# Patient Record
Sex: FEMALE | Race: BLACK OR AFRICAN AMERICAN
[De-identification: names, ages, dates, MRNs, and addresses within clinical notes are randomized per-mention and may not be internally consistent; named-entity substitution may affect disease eponyms.]

---

## 2020-03-04 ENCOUNTER — HOSPITAL ENCOUNTER (OUTPATIENT)
Dept: HOSPITAL 62 - ER | Age: 85
Setting detail: OBSERVATION
LOS: 2 days | Discharge: NURSING, REHAB OR LONG-TERM CARE | End: 2020-03-06
Attending: INTERNAL MEDICINE | Admitting: INTERNAL MEDICINE
Payer: MEDICARE

## 2020-03-04 DIAGNOSIS — E03.9: ICD-10-CM

## 2020-03-04 DIAGNOSIS — R00.0: ICD-10-CM

## 2020-03-04 DIAGNOSIS — F03.90: ICD-10-CM

## 2020-03-04 DIAGNOSIS — Z87.01: ICD-10-CM

## 2020-03-04 DIAGNOSIS — Z92.89: ICD-10-CM

## 2020-03-04 DIAGNOSIS — R06.82: ICD-10-CM

## 2020-03-04 DIAGNOSIS — E66.01: ICD-10-CM

## 2020-03-04 DIAGNOSIS — I11.0: ICD-10-CM

## 2020-03-04 DIAGNOSIS — R40.0: ICD-10-CM

## 2020-03-04 DIAGNOSIS — Z79.4: ICD-10-CM

## 2020-03-04 DIAGNOSIS — J44.1: Primary | ICD-10-CM

## 2020-03-04 DIAGNOSIS — R09.02: ICD-10-CM

## 2020-03-04 DIAGNOSIS — M17.0: ICD-10-CM

## 2020-03-04 DIAGNOSIS — Z79.82: ICD-10-CM

## 2020-03-04 DIAGNOSIS — R53.2: ICD-10-CM

## 2020-03-04 DIAGNOSIS — M19.90: ICD-10-CM

## 2020-03-04 DIAGNOSIS — I50.9: ICD-10-CM

## 2020-03-04 DIAGNOSIS — Z79.899: ICD-10-CM

## 2020-03-04 DIAGNOSIS — Z74.01: ICD-10-CM

## 2020-03-04 DIAGNOSIS — Z99.81: ICD-10-CM

## 2020-03-04 DIAGNOSIS — E11.65: ICD-10-CM

## 2020-03-04 LAB
ADD MANUAL DIFF: NO
ALBUMIN SERPL-MCNC: 3.4 G/DL (ref 3.5–5)
ALP SERPL-CCNC: 78 U/L (ref 38–126)
ANION GAP SERPL CALC-SCNC: 11 MMOL/L (ref 5–19)
APPEARANCE UR: CLEAR
APTT PPP: (no result) S
ARTERIAL BLOOD FIO2: (no result)
ARTERIAL BLOOD H2CO3: 1.57 MMOL/L (ref 1.05–1.35)
ARTERIAL BLOOD HCO3: 33.9 MMOL/L (ref 20–24)
ARTERIAL BLOOD PCO2: 52 MMHG (ref 35–45)
ARTERIAL BLOOD PH: 7.43 (ref 7.35–7.45)
ARTERIAL BLOOD PO2: 53.9 MMHG (ref 80–100)
ARTERIAL BLOOD TOTAL CO2: 35.5 MMOL/L (ref 21–25)
AST SERPL-CCNC: 18 U/L (ref 14–36)
BASE EXCESS BLDA CALC-SCNC: 8.5 MMOL/L
BASOPHILS # BLD AUTO: 0.1 10^3/UL (ref 0–0.2)
BASOPHILS NFR BLD AUTO: 0.9 % (ref 0–2)
BILIRUB DIRECT SERPL-MCNC: 0.3 MG/DL (ref 0–0.4)
BILIRUB SERPL-MCNC: 0.3 MG/DL (ref 0.2–1.3)
BILIRUB UR QL STRIP: NEGATIVE
BUN SERPL-MCNC: 12 MG/DL (ref 7–20)
CALCIUM: 9.8 MG/DL (ref 8.4–10.2)
CHLORIDE SERPL-SCNC: 97 MMOL/L (ref 98–107)
CK SERPL-CCNC: 34 U/L (ref 30–135)
CO2 SERPL-SCNC: 35 MMOL/L (ref 22–30)
EOSINOPHIL # BLD AUTO: 0.5 10^3/UL (ref 0–0.6)
EOSINOPHIL NFR BLD AUTO: 6.4 % (ref 0–6)
ERYTHROCYTE [DISTWIDTH] IN BLOOD BY AUTOMATED COUNT: 18.7 % (ref 11.5–14)
GLUCOSE SERPL-MCNC: 134 MG/DL (ref 75–110)
GLUCOSE UR STRIP-MCNC: NEGATIVE MG/DL
HCT VFR BLD CALC: 30.4 % (ref 36–47)
HGB BLD-MCNC: 9.2 G/DL (ref 12–15.5)
KETONES UR STRIP-MCNC: (no result) MG/DL
LYMPHOCYTES # BLD AUTO: 2.4 10^3/UL (ref 0.5–4.7)
LYMPHOCYTES NFR BLD AUTO: 34.5 % (ref 13–45)
MCH RBC QN AUTO: 25 PG (ref 27–33.4)
MCHC RBC AUTO-ENTMCNC: 30.4 G/DL (ref 32–36)
MCV RBC AUTO: 82 FL (ref 80–97)
MONOCYTES # BLD AUTO: 0.5 10^3/UL (ref 0.1–1.4)
MONOCYTES NFR BLD AUTO: 6.7 % (ref 3–13)
NEUTROPHILS # BLD AUTO: 3.6 10^3/UL (ref 1.7–8.2)
NEUTS SEG NFR BLD AUTO: 51.5 % (ref 42–78)
NITRITE UR QL STRIP: NEGATIVE
PH UR STRIP: 8 [PH] (ref 5–9)
PLATELET # BLD: 445 10^3/UL (ref 150–450)
POTASSIUM SERPL-SCNC: 4.5 MMOL/L (ref 3.6–5)
PROT SERPL-MCNC: 7.3 G/DL (ref 6.3–8.2)
PROT UR STRIP-MCNC: 100 MG/DL
RBC # BLD AUTO: 3.7 10^6/UL (ref 3.72–5.28)
SAO2 % BLDA: 88.4 % (ref 94–98)
SP GR UR STRIP: 1.01
TOTAL CELLS COUNTED % (AUTO): 100 %
UROBILINOGEN UR-MCNC: NEGATIVE MG/DL (ref ?–2)
WBC # BLD AUTO: 7.1 10^3/UL (ref 4–10.5)

## 2020-03-04 PROCEDURE — 93005 ELECTROCARDIOGRAM TRACING: CPT

## 2020-03-04 PROCEDURE — 71045 X-RAY EXAM CHEST 1 VIEW: CPT

## 2020-03-04 PROCEDURE — 36600 WITHDRAWAL OF ARTERIAL BLOOD: CPT

## 2020-03-04 PROCEDURE — 94660 CPAP INITIATION&MGMT: CPT

## 2020-03-04 PROCEDURE — 82550 ASSAY OF CK (CPK): CPT

## 2020-03-04 PROCEDURE — 94640 AIRWAY INHALATION TREATMENT: CPT

## 2020-03-04 PROCEDURE — 83735 ASSAY OF MAGNESIUM: CPT

## 2020-03-04 PROCEDURE — 97163 PT EVAL HIGH COMPLEX 45 MIN: CPT

## 2020-03-04 PROCEDURE — 93010 ELECTROCARDIOGRAM REPORT: CPT

## 2020-03-04 PROCEDURE — 84484 ASSAY OF TROPONIN QUANT: CPT

## 2020-03-04 PROCEDURE — 82803 BLOOD GASES ANY COMBINATION: CPT

## 2020-03-04 PROCEDURE — 80053 COMPREHEN METABOLIC PANEL: CPT

## 2020-03-04 PROCEDURE — 97530 THERAPEUTIC ACTIVITIES: CPT

## 2020-03-04 PROCEDURE — 85379 FIBRIN DEGRADATION QUANT: CPT

## 2020-03-04 PROCEDURE — 81001 URINALYSIS AUTO W/SCOPE: CPT

## 2020-03-04 PROCEDURE — 36415 COLL VENOUS BLD VENIPUNCTURE: CPT

## 2020-03-04 PROCEDURE — 80048 BASIC METABOLIC PNL TOTAL CA: CPT

## 2020-03-04 PROCEDURE — 87040 BLOOD CULTURE FOR BACTERIA: CPT

## 2020-03-04 PROCEDURE — 85025 COMPLETE CBC W/AUTO DIFF WBC: CPT

## 2020-03-04 PROCEDURE — 82962 GLUCOSE BLOOD TEST: CPT

## 2020-03-04 PROCEDURE — 99285 EMERGENCY DEPT VISIT HI MDM: CPT

## 2020-03-04 PROCEDURE — G0378 HOSPITAL OBSERVATION PER HR: HCPCS

## 2020-03-04 RX ADMIN — METOPROLOL TARTRATE SCH MG: 25 TABLET, FILM COATED ORAL at 23:23

## 2020-03-04 RX ADMIN — ZINC OXIDE SCH APPLIC: 200 OINTMENT TOPICAL at 23:24

## 2020-03-04 RX ADMIN — MIRTAZAPINE SCH MG: 15 TABLET, FILM COATED ORAL at 23:24

## 2020-03-04 RX ADMIN — GABAPENTIN SCH MG: 300 CAPSULE ORAL at 23:24

## 2020-03-04 RX ADMIN — PREDNISONE SCH MG: 20 TABLET ORAL at 23:59

## 2020-03-04 RX ADMIN — HEPARIN SODIUM SCH UNIT: 5000 INJECTION, SOLUTION INTRAVENOUS; SUBCUTANEOUS at 23:23

## 2020-03-04 NOTE — PDOC H&P
History of Present Illness


Admission Date/PCP: 


  03/04/20 19:19





  RAMAN REYES MD





History of Present Illness: 


MK SHELL is a 87 year old female with past medical history of COPD, 

severe community-acquired pneumonia, severe dementia, T2DM who presented from 

nursing facility after 1 day of acute respiratory distress with shortness of 

breath and HERNANDEZ.  Patient was reportedly admitted to [facility] for 

approximately 1 month for severe pneumonia and was released to nearby nursing [facility] on day of admission.  History cannot be obtained by patient due to severe

dementia, history primarily taken from ED staff and records.  Patient initially 

placed on BiPAP in ED but she did not tolerate this at all and this was removed.

 She became more stable on only 2 L nasal cannula which she uses at home already

chronically.  She does not look toxic on exam and is breathing comfortably on 2 

L nasal cannula.  He is afebrile and has a normal WBC.  Started on azithromycin,

steroids, scheduled nebulizer treatments.








Past Medical History


Cardiac Medical History: Reports: Congestive Heart Failure, Hyperlipidema, 

Hypertension, Other - Lymphedema


Pulmonary Medical History: Reports: Chronic Obstructive Pulmonary Disease (COPD

), Pneumonia


Neurological Medical History: Reports: Seizures


Endocrine Medical History: Reports: Diabetes Mellitus Type 2, Hypothyroidism


GI Medical History: Reports: Gastroesophageal Reflux Disease


Musculoskeltal Medical History: Reports: Arthritis, Gout


Psychiatric Medical History: Reports: Dementia





Social History


Information Source: Emergency Med Personnel, [facility] Records


Lives with: Nursing Home


Smoking Status: Unknown if Ever Smoked


Electronic Cigarette use?: No





- Advance Directive


Resuscitation Status: Full Code





Family History


Family History: Other - Family history is not available.


Parental Family History Reviewed: No - Patient has dementia and does not know 

answers to questions


Children Family History Reviewed: NA


Sibling(s) Family History Reviewed.: NA





Medication/Allergy


Home Medications: 








Acetaminophen [Tylenol 325 mg Tablet] 650 mg PO Q6HP PRN 03/04/20 


Albuterol Sulfate [Ventolin 0.083% Neb 2.5 mg/3 mL Ampul] 1 vial NEB Q6HP PRN 

03/04/20 


Albuterol Sulfate [Ventolin Hfa 8 gm Mdi] 2 puff IH Q4HP PRN 03/04/20 


Aspirin [Aspirin 81 mg Chewable Tablet] 81 mg PO DAILY 03/04/20 


Benzonatate [Tessalon Perles 100 mg Capsule] 100 mg PO TIDP PRN 03/04/20 


Budesonide/Formoterol Fumarate [Symbicort -4.5 mcg Inhaler 6 gm] 2 puff 

IH Q12 03/04/20 


Ferrous Sulfate [Feosol 325 mg Tablet] 325 mg PO DAILY 03/04/20 


Gabapentin [Neurontin 300 mg Capsule] 600 mg PO Q8 03/04/20 


Glimepiride [Amaryl 4 mg Tablet] 4 mg PO DAILY 03/04/20 


Insulin Glargine/Lixisenatide [Soliqua 100 Unit-33 Mcg/ml Pen] 28 units SQ QHS 

03/04/20 


Levothyroxine Sodium [Synthroid 0.05 mg Tablet] 0.05 mg PO Q6AM 03/04/20 


Metoprolol Tartrate [Lopressor 25 mg Tablet] 12.5 mg PO Q12 03/04/20 


Mirtazapine 30 mg PO QHS 03/04/20 


Montelukast Sodium [Singulair 10 mg Tablet] 10 mg PO DAILY 03/04/20 


Multivit,Calc,Mins/Iron/Folic [Thera M Plus Tablet] 1 tab PO QPM 03/04/20 


Omeprazole 40 mg PO BID 03/04/20 


Thiamine HCl [Thiamine 100 mg Tablet] 100 mg PO DAILY 03/04/20 


Zinc Oxide [Skin Protectant] 1 applic TOP Q12 03/04/20 








Allergies/Adverse Reactions: 


                                        





barley Allergy (Verified 03/04/20 12:20)


   


ranitidine Allergy (Verified 03/04/20 12:20)


   


sulfamethoxazole [From Bactrim] Allergy (Verified 03/04/20 12:20)


   


trimethoprim [From Bactrim] Allergy (Verified 03/04/20 12:20)


   


iodine Adverse Reaction (Verified 03/04/20 12:20)


   


shellfish derived Adverse Reaction (Verified 03/04/20 12:20)


   











Review of Systems


All systems: reviewed and no additional remarkable complaints except as stated -

ROS unobtainable due to severe dementia, see HPI





Physical Exam


Vital Signs: 


                                        











Temp Pulse Resp BP Pulse Ox


 


 99.2 F      29 H  140/65 H  100 


 


 03/04/20 20:15     03/04/20 20:01  03/04/20 20:01  03/04/20 20:01








                                 Intake & Output











 03/03/20 03/04/20 03/05/20





 06:59 06:59 06:59


 


Output Total   500


 


Balance   -500











General appearance: PRESENT: no acute distress, cooperative


Head exam: PRESENT: atraumatic, normocephalic


Eye exam: PRESENT: conjunctiva pink


Mouth exam: PRESENT: moist


Respiratory exam: PRESENT: decreased breath sounds - Overall bilateral chronic 

diminished breath sounds consistent with COPD, unlabored, wheezes.  ABSENT: 

crackles, rales, tachypnea


Cardiovascular exam: PRESENT: RRR.  ABSENT: diastolic murmur, rubs, systolic 

murmur


GI/Abdominal exam: PRESENT: normal bowel sounds, soft.  ABSENT: distended, 

guarding, mass, organolmegaly, rebound, tenderness


Rectal exam: PRESENT: deferred


Extremities exam: ABSENT: pedal edema


Neurological exam: PRESENT: alert, awake, oriented to person.  ABSENT: oriented 

to place, oriented to time, oriented to situation


Psychiatric exam: PRESENT: appropriate affect, normal mood


Skin exam: PRESENT: dry, intact, warm





Results


Laboratory Results: 


                                        





                                 03/04/20 16:20 





                                 03/04/20 16:20 





                                        











  03/04/20 03/04/20 03/04/20





  15:40 15:59 16:20


 


WBC    7.1


 


RBC    3.70 L


 


Hgb    9.2 L


 


Hct    30.4 L


 


MCV    82


 


MCH    25.0 L


 


MCHC    30.4 L


 


RDW    18.7 H


 


Plt Count    445


 


Seg Neutrophils %    51.5


 


Carbonic Acid  1.57 H  


 


HCO3/H2CO3 Ratio  21:1  


 


ABG pH  7.43  


 


ABG pCO2  52.0 H  


 


ABG pO2  53.9 L  


 


ABG HCO3  33.9 H  


 


ABG O2 Saturation  88.4 L  


 


ABG Base Excess  8.5  


 


FiO2  2L  


 


Sodium   


 


Potassium   


 


Chloride   


 


Carbon Dioxide   


 


Anion Gap   


 


BUN   


 


Creatinine   


 


Est GFR (African Amer)   


 


Glucose   


 


Calcium   


 


Total Bilirubin   


 


AST   


 


Alkaline Phosphatase   


 


Total Protein   


 


Albumin   


 


Urine Color   STRAW 


 


Urine Appearance   CLEAR 


 


Urine pH   8.0 


 


Ur Specific Gravity   1.010 


 


Urine Protein   100 H 


 


Urine Glucose (UA)   NEGATIVE 


 


Urine Ketones   TRACE H 


 


Urine Blood   NEGATIVE 


 


Urine Nitrite   NEGATIVE 


 


Ur Leukocyte Esterase   NEGATIVE 


 


Urine WBC (Auto)   1 


 


Urine RBC (Auto)   4 














  03/04/20





  16:20


 


WBC 


 


RBC 


 


Hgb 


 


Hct 


 


MCV 


 


MCH 


 


MCHC 


 


RDW 


 


Plt Count 


 


Seg Neutrophils % 


 


Carbonic Acid 


 


HCO3/H2CO3 Ratio 


 


ABG pH 


 


ABG pCO2 


 


ABG pO2 


 


ABG HCO3 


 


ABG O2 Saturation 


 


ABG Base Excess 


 


FiO2 


 


Sodium  142.6


 


Potassium  4.5


 


Chloride  97 L


 


Carbon Dioxide  35 H


 


Anion Gap  11


 


BUN  12


 


Creatinine  0.89


 


Est GFR (African Amer)  > 60


 


Glucose  134 H


 


Calcium  9.8


 


Total Bilirubin  0.3


 


AST  18


 


Alkaline Phosphatase  78


 


Total Protein  7.3


 


Albumin  3.4 L


 


Urine Color 


 


Urine Appearance 


 


Urine pH 


 


Ur Specific Gravity 


 


Urine Protein 


 


Urine Glucose (UA) 


 


Urine Ketones 


 


Urine Blood 


 


Urine Nitrite 


 


Ur Leukocyte Esterase 


 


Urine WBC (Auto) 


 


Urine RBC (Auto) 








                                        











  03/04/20 03/04/20





  16:20 16:20


 


Creatine Kinase  34 


 


Troponin I   < 0.012











Impressions: 


                                        





Chest X-Ray  03/04/20 00:00


IMPRESSION:  1. Cardiomegaly without pulmonary edema.


2. Asymmetric left retrocardiac opacities.  Clinical correlate to exclude a 

pneumonia is recommended.


 














Assessment and Plan





- Diagnosis


(1) COPD exacerbation


Is this a current diagnosis for this admission?: Yes   


Plan: 





Wheezing on exam consistent with COPD exacerbation, no discrete crackles noted 

on exam but somewhat limited exam quality due to habitus


Schedule duo nebs


Azithromycin


We will hold off on broader antibiotics for now as she has no fever and a 

normal WBC and was recently given a course of antibiotics at outside hospital


Check PVL BLE to rule out DVT and d-dimer given her prolonged immobility she is

at risk for DVT/PE; get CTA chest in a.m. if these tests are positive and treat 

appropriately


Prednisone 40 mg daily for 5 days








(2) T2DM (type 2 diabetes mellitus)


Qualifiers: 


   Diabetes mellitus long term insulin use: with long term use   Diabetes 

mellitus complication status: with hyperglycemia   Qualified Code(s): E11.65 - 

Type 2 diabetes mellitus with hyperglycemia; Z79.4 - Long term (current) use of 

insulin   


Is this a current diagnosis for this admission?: Yes   


Plan: 





Accu-Cheks


Continue home Lantus


Low-dose correctional insulin








(3) CHF (congestive heart failure)


Is this a current diagnosis for this admission?: Yes   


Plan: 





Unspecified CHF, unclear if diastolic or systolic


Consider requesting outside records in a.m.


Hold off on IV fluids unless absolutely indicated to avoid volume overload








(4) Hypothyroidism


Is this a current diagnosis for this admission?: Yes   


Plan: 





Synthroid continued








(5) Severe dementia


Is this a current diagnosis for this admission?: Yes   


Plan: 





Daughter is M POA


Bedbound at baseline per ED staff








(6) History of recent hospitalization


Is this a current diagnosis for this admission?: Yes   


Plan: 





Recently treated for pneumonia at [facility] then sent to SNF 3/4








- Time


Time Spent with patient: 35 or more minutes


Anticipated discharge: SNF





- Inpatient Certification


Medical Necessity: Significant Comorbidiites Make Outpatient Treatment Too 

Risky, Need Close Monitoring Due to Risk of Patient Decompensation

## 2020-03-04 NOTE — RADIOLOGY REPORT (SQ)
EXAM DESCRIPTION:  CHEST SINGLE VIEW



COMPLETED DATE/TIME:  3/4/2020 12:49 pm



REASON FOR STUDY:  respitory distress



COMPARISON:  None.



EXAM PARAMETERS:  NUMBER OF VIEWS: One view.

TECHNIQUE:  An AP view of the chest was obtained.

RADIATION DOSE: NA

LIMITATIONS: None.



FINDINGS:  LUNGS AND PLEURA: Asymmetric parenchymal opacities in the medial aspect of the inferior le
ft hemithorax.  There is no sizable pleural effusion or pneumothorax.

MEDIASTINUM AND HILAR STRUCTURES: No mediastinal or hilar contour abnormality.

HEART AND VASCULAR STRUCTURES: The cardiac silhouette is enlarged.  The pulmonary vasculature is with
in normal limits.

BONES: No acute findings.

HARDWARE: None in the chest.

OTHER: No other finding.



IMPRESSION:  1. Cardiomegaly without pulmonary edema.

2. Asymmetric left retrocardiac opacities.  Clinical correlate to exclude a pneumonia is recommended.




TECHNICAL DOCUMENTATION:  JOB ID:  [id number]

 2011 [employer]- All Rights Reserved



Reading location - IP/workstation name: [email or web address]

## 2020-03-04 NOTE — ER DOCUMENT REPORT
Entered by MARTINEZ SEGURA SCRIBE  03/04/20 1518 





Acting as scribe for:ANJANA BONILLA MD





ED General





- General


Chief Complaint: Respiratory Distress


Stated Complaint: DIFFICULTY BREATHING


Time Seen by Provider: 03/04/20 15:07


Primary Care Provider: 


RAMAN REYES MD [Primary Care Provider] - Follow up as needed


Information source: Patient, Outside Facility Records


Notes: 


87-year-old female with dementia and COPD presents to the emergency department 

via with respiratory distress. Patient was brought over from nursing home and 

reports shortness of breath.  


Patient was hospitalized at [place] on 2/3/2020 (1 month ago) for SOB and 

diagnosed with community-acquired pneumonia. Patient was transferred to [facility] 

on 2/12/2020. Patient was discharged yesterday (3/3/2020) and checked into 

nursing home yesterday.


TRAVEL OUTSIDE OF THE [place] IN LAST 30 DAYS: No





- Related Data


Allergies/Adverse Reactions: 


                                        





barley Allergy (Verified 03/04/20 12:20)


   


ranitidine Allergy (Verified 03/04/20 12:20)


   


sulfamethoxazole [From Bactrim] Allergy (Verified 03/04/20 12:20)


   


trimethoprim [From Bactrim] Allergy (Verified 03/04/20 12:20)


   


iodine Adverse Reaction (Verified 03/04/20 12:20)


   


shellfish derived Adverse Reaction (Verified 03/04/20 12:20)


   











Past Medical History





- General


Information source: Patient





- Social History


Smoking Status: Unknown if Ever Smoked


Cigarette use (# per day): No


Chew tobacco use (# tins/day): No


Smoking Education Provided: No


Frequency of alcohol use: None


Drug Abuse: None


Lives with: Nursing Home


Family History: Other - Family history is not available.


Patient has suicidal ideation: No


Patient has homicidal ideation: No





- Past Medical History


Cardiac Medical History: Reports: Hx Congestive Heart Failure, Hx 

Hypercholesterolemia, Hx Hypertension, Other - Lymphedema


Pulmonary Medical History: Reports: Hx COPD, Hx Pneumonia


Neurological Medical History: Reports: Hx Seizures


Endocrine Medical History: Reports: Hx Diabetes Mellitus Type 2, Hx 

Hypothyroidism


Renal/ Medical History: Reports: Hx Renal Insufficiency


GI Medical History: Reports: Hx Gastroesophageal Reflux Disease


Musculoskeletal Medical History: Reports Hx Arthritis, Reports Hx Gout


Psychiatric Medical History: Reports: Hx Dementia


Surgical Hx: Other - Surgical history unknown





Review of Systems





- Review of Systems


-: Yes ROS unobtainable due to patient's medical condition - Patient has 

dementia





Physical Exam





- Vital signs


Vitals: 


                                        











Resp


 


 29 H


 


 03/04/20 12:00














- Notes


Notes: 


Physical Exam:


 


General: Alert, appears well. Conversational. Morbidly Obese.


 


HEENT: Normocephalic. Atraumatic. PERRL. Extraocular movements intact. 

Oropharynx clear.


 


Neck: Supple. Non-tender.


 


Respiratory: Respiratory rate of 40. Rales and rhonchi in the lower lung fields 

on expiration. Moans with each expiration. 


 


Cardiovascular: Regular rate and rhythm. 


 


Abdominal: Normal Inspection. Non-tender. No distension. Normal Bowel Sounds. 


 


Back: No gross abnormalities. 


 


Extremities: Moves all four extremities.


Upper extremities: Normal inspection. Normal ROM.  


Lower extremities:  No edema. Normal ROM. Osteoarthritis changes in knees 

bilaterally.


 


Neurological: Normal cognition. AAOx4. Normal speech.  


 


Psychological: Normal affect. Normal Mood. 


 


Skin: Warm. Dry. Normal color.





Course





- Re-evaluation


Re-evalutation: 





03/04/20 18:29


Room air ABG shows a pH 7.43, PCO2 52, PO2 of 53.9, O2 saturation 88.4%.


Patient was placed on 2 liters O2 nasal cannula after the ABG results were 

available.  Pulse oximetry increased to 100%.











- Vital Signs


Vital signs: 


                                        











Temp Pulse Resp BP Pulse Ox


 


 98.4 F      35 H  169/86 H  91 L


 


 03/04/20 14:17     03/04/20 18:02  03/04/20 18:02  03/04/20 17:17














- Laboratory


Result Diagrams: 


                                 03/04/20 16:20





                                 03/04/20 16:20


Laboratory results interpreted by me: 


                                        











  03/04/20 03/04/20 03/04/20





  15:40 15:59 16:20


 


RBC    3.70 L


 


Hgb    9.2 L


 


Hct    30.4 L


 


MCH    25.0 L


 


MCHC    30.4 L


 


RDW    18.7 H


 


Eos % (Auto)    6.4 H


 


Carbonic Acid  1.57 H  


 


ABG pCO2  52.0 H  


 


ABG pO2  53.9 L  


 


ABG HCO3  33.9 H  


 


ABG Total CO2  35.5 H  


 


ABG O2 Saturation  88.4 L  


 


Chloride   


 


Carbon Dioxide   


 


Glucose   


 


Albumin   


 


Urine Protein   100 H 


 


Urine Ketones   TRACE H 














  03/04/20





  16:20


 


RBC 


 


Hgb 


 


Hct 


 


MCH 


 


MCHC 


 


RDW 


 


Eos % (Auto) 


 


Carbonic Acid 


 


ABG pCO2 


 


ABG pO2 


 


ABG HCO3 


 


ABG Total CO2 


 


ABG O2 Saturation 


 


Chloride  97 L


 


Carbon Dioxide  35 H


 


Glucose  134 H


 


Albumin  3.4 L


 


Urine Protein 


 


Urine Ketones 














- Diagnostic Test


Radiology reviewed: Image reviewed, Reports reviewed - Chest x-ray shows a left 

retrocardiac opacity





- EKG Interpretation by Me


EKG shows normal: Sinus rhythm, Axis, Intervals, QRS Complexes, ST-T Waves


Rate: Normal - 93


Rhythm: NSR





- Consults


  ** Dr. Staley


Consulted provider: other - Agrees with telemetry admit, will have the night 

hospitalist see the patient.





Critical Care Note





- Critical Care Note


Total time excluding time spent on procedures (mins): 40





Discharge





- Discharge


Clinical Impression: 


 Tachypnea, Hypoxemia, CO2 retention, Retrocardiac opacity, Tachycardia





COPD (chronic obstructive pulmonary disease)


Qualifiers:


 COPD type: unspecified COPD Qualified Code(s): J44.9 - Chronic obstructive 

pulmonary disease, unspecified





Hypertension


Qualifiers:


 Hypertension type: essential hypertension Qualified Code(s): I10 - Essential 

(primary) hypertension





Condition: Good


Disposition: ADMITTED AS INPATIENT


Admitting Provider: Luís (Hospitalist)


Unit Admitted: Telemetry


Referrals: 


RAMAN REYES MD [Primary Care Provider] - Follow up as needed





I personally performed the services described in the documentation, reviewed and

edited the documentation which was dictated to the scribe in my presence, and it

accurately records my words and actions.

## 2020-03-05 LAB
ADD MANUAL DIFF: NO
ANION GAP SERPL CALC-SCNC: 8 MMOL/L (ref 5–19)
ARTERIAL BLOOD FIO2: (no result)
ARTERIAL BLOOD H2CO3: 1.62 MMOL/L (ref 1.05–1.35)
ARTERIAL BLOOD HCO3: 34.9 MMOL/L (ref 20–24)
ARTERIAL BLOOD PCO2: 53.8 MMHG (ref 35–45)
ARTERIAL BLOOD PH: 7.43 (ref 7.35–7.45)
ARTERIAL BLOOD PO2: 86.8 MMHG (ref 80–100)
ARTERIAL BLOOD TOTAL CO2: 36.6 MMOL/L (ref 21–25)
BASE EXCESS BLDA CALC-SCNC: 9.3 MMOL/L
BASOPHILS # BLD AUTO: 0 10^3/UL (ref 0–0.2)
BASOPHILS NFR BLD AUTO: 0.6 % (ref 0–2)
BUN SERPL-MCNC: 12 MG/DL (ref 7–20)
CALCIUM: 9.8 MG/DL (ref 8.4–10.2)
CHLORIDE SERPL-SCNC: 98 MMOL/L (ref 98–107)
CO2 SERPL-SCNC: 36 MMOL/L (ref 22–30)
EOSINOPHIL # BLD AUTO: 0.1 10^3/UL (ref 0–0.6)
EOSINOPHIL NFR BLD AUTO: 1 % (ref 0–6)
ERYTHROCYTE [DISTWIDTH] IN BLOOD BY AUTOMATED COUNT: 19.5 % (ref 11.5–14)
GLUCOSE SERPL-MCNC: 143 MG/DL (ref 75–110)
HCT VFR BLD CALC: 29.5 % (ref 36–47)
HGB BLD-MCNC: 9.6 G/DL (ref 12–15.5)
LYMPHOCYTES # BLD AUTO: 1.3 10^3/UL (ref 0.5–4.7)
LYMPHOCYTES NFR BLD AUTO: 25.2 % (ref 13–45)
MCH RBC QN AUTO: 26.2 PG (ref 27–33.4)
MCHC RBC AUTO-ENTMCNC: 32.6 G/DL (ref 32–36)
MCV RBC AUTO: 80 FL (ref 80–97)
MONOCYTES # BLD AUTO: 0.1 10^3/UL (ref 0.1–1.4)
MONOCYTES NFR BLD AUTO: 1.9 % (ref 3–13)
NEUTROPHILS # BLD AUTO: 3.7 10^3/UL (ref 1.7–8.2)
NEUTS SEG NFR BLD AUTO: 71.3 % (ref 42–78)
PLATELET # BLD: 422 10^3/UL (ref 150–450)
POTASSIUM SERPL-SCNC: 4.9 MMOL/L (ref 3.6–5)
RBC # BLD AUTO: 3.67 10^6/UL (ref 3.72–5.28)
SAO2 % BLDA: 96.6 % (ref 94–98)
TOTAL CELLS COUNTED % (AUTO): 100 %
WBC # BLD AUTO: 5.2 10^3/UL (ref 4–10.5)

## 2020-03-05 RX ADMIN — HEPARIN SODIUM SCH UNIT: 5000 INJECTION, SOLUTION INTRAVENOUS; SUBCUTANEOUS at 13:17

## 2020-03-05 RX ADMIN — ZINC OXIDE SCH APPLIC: 200 OINTMENT TOPICAL at 10:59

## 2020-03-05 RX ADMIN — IPRATROPIUM BROMIDE AND ALBUTEROL SULFATE SCH ML: 2.5; .5 SOLUTION RESPIRATORY (INHALATION) at 20:01

## 2020-03-05 RX ADMIN — GLIMEPIRIDE SCH MG: 4 TABLET ORAL at 10:55

## 2020-03-05 RX ADMIN — PREDNISONE SCH MG: 20 TABLET ORAL at 10:58

## 2020-03-05 RX ADMIN — ACETAMINOPHEN PRN MG: 325 TABLET ORAL at 20:41

## 2020-03-05 RX ADMIN — HEPARIN SODIUM SCH UNIT: 5000 INJECTION, SOLUTION INTRAVENOUS; SUBCUTANEOUS at 05:56

## 2020-03-05 RX ADMIN — LEVOTHYROXINE SODIUM SCH MG: 50 TABLET ORAL at 05:54

## 2020-03-05 RX ADMIN — ASPIRIN SCH MG: 81 TABLET, CHEWABLE ORAL at 10:58

## 2020-03-05 RX ADMIN — INSULIN LISPRO SCH UNIT: 100 INJECTION, SOLUTION INTRAVENOUS; SUBCUTANEOUS at 00:24

## 2020-03-05 RX ADMIN — PANTOPRAZOLE SODIUM SCH MG: 40 TABLET, DELAYED RELEASE ORAL at 10:55

## 2020-03-05 RX ADMIN — IPRATROPIUM BROMIDE AND ALBUTEROL SULFATE SCH ML: 2.5; .5 SOLUTION RESPIRATORY (INHALATION) at 04:23

## 2020-03-05 RX ADMIN — IPRATROPIUM BROMIDE AND ALBUTEROL SULFATE SCH ML: 2.5; .5 SOLUTION RESPIRATORY (INHALATION) at 00:45

## 2020-03-05 RX ADMIN — MONTELUKAST SODIUM SCH MG: 10 TABLET, FILM COATED ORAL at 10:58

## 2020-03-05 RX ADMIN — MULTIVITAMIN TABLET SCH TAB: TABLET at 17:04

## 2020-03-05 RX ADMIN — IPRATROPIUM BROMIDE AND ALBUTEROL SULFATE SCH ML: 2.5; .5 SOLUTION RESPIRATORY (INHALATION) at 16:07

## 2020-03-05 RX ADMIN — GABAPENTIN SCH MG: 300 CAPSULE ORAL at 21:22

## 2020-03-05 RX ADMIN — MIRTAZAPINE SCH MG: 15 TABLET, FILM COATED ORAL at 21:22

## 2020-03-05 RX ADMIN — INSULIN LISPRO SCH UNIT: 100 INJECTION, SOLUTION INTRAVENOUS; SUBCUTANEOUS at 11:53

## 2020-03-05 RX ADMIN — IPRATROPIUM BROMIDE AND ALBUTEROL SULFATE SCH ML: 2.5; .5 SOLUTION RESPIRATORY (INHALATION) at 08:23

## 2020-03-05 RX ADMIN — HEPARIN SODIUM SCH UNIT: 5000 INJECTION, SOLUTION INTRAVENOUS; SUBCUTANEOUS at 21:22

## 2020-03-05 RX ADMIN — Medication SCH MG: at 10:58

## 2020-03-05 RX ADMIN — GABAPENTIN SCH MG: 300 CAPSULE ORAL at 13:17

## 2020-03-05 RX ADMIN — AZITHROMYCIN SCH MG: 250 TABLET, FILM COATED ORAL at 10:55

## 2020-03-05 RX ADMIN — METOPROLOL TARTRATE SCH MG: 25 TABLET, FILM COATED ORAL at 21:21

## 2020-03-05 RX ADMIN — IPRATROPIUM BROMIDE AND ALBUTEROL SULFATE SCH ML: 2.5; .5 SOLUTION RESPIRATORY (INHALATION) at 23:33

## 2020-03-05 RX ADMIN — FLUTICASONE FUROATE AND VILANTEROL TRIFENATATE SCH INHALER: 200; 25 POWDER RESPIRATORY (INHALATION) at 10:58

## 2020-03-05 RX ADMIN — INSULIN LISPRO SCH UNIT: 100 INJECTION, SOLUTION INTRAVENOUS; SUBCUTANEOUS at 05:56

## 2020-03-05 RX ADMIN — FERROUS SULFATE TAB 325 MG (65 MG ELEMENTAL FE) SCH MG: 325 (65 FE) TAB at 10:58

## 2020-03-05 RX ADMIN — PANTOPRAZOLE SODIUM SCH MG: 40 TABLET, DELAYED RELEASE ORAL at 17:04

## 2020-03-05 RX ADMIN — GABAPENTIN SCH MG: 300 CAPSULE ORAL at 05:54

## 2020-03-05 RX ADMIN — METOPROLOL TARTRATE SCH MG: 25 TABLET, FILM COATED ORAL at 10:58

## 2020-03-05 RX ADMIN — INSULIN LISPRO SCH UNIT: 100 INJECTION, SOLUTION INTRAVENOUS; SUBCUTANEOUS at 17:03

## 2020-03-05 RX ADMIN — IPRATROPIUM BROMIDE AND ALBUTEROL SULFATE SCH ML: 2.5; .5 SOLUTION RESPIRATORY (INHALATION) at 12:19

## 2020-03-05 RX ADMIN — ZINC OXIDE SCH APPLIC: 200 OINTMENT TOPICAL at 21:27

## 2020-03-05 NOTE — PDOC PROGRESS REPORT
Subjective


Progress Note for:: 03/05/20


Subjective:: 


Per the nurses the patient had a reasonable night.  Her breathing has settled.  

She is not on BiPAP at this time.  She is somewhat somnolent.  The nurses state 

that this is not out of her normal pattern of behavior.  They did reach out to 

the skilled facility for further information.  Because of her history of COPD 

and her elevated PCO2 on admitting blood gas I am going to check another ABG.


Reason For Visit: 


COPD EXACERBATION








Physical Exam


Vital Signs: 


                                        











Temp Pulse Resp BP Pulse Ox


 


 98.0 F   88   18   139/61 H  90 L


 


 03/05/20 11:16  03/05/20 12:19  03/05/20 12:19  03/05/20 11:16  03/05/20 12:19








                                 Intake & Output











 03/04/20 03/05/20 03/06/20





 06:59 06:59 06:59


 


Output Total  750 300


 


Balance  -750 -300


 


Weight  97.7 kg 











General appearance: PRESENT: no acute distress, other - Somnolent


Head exam: PRESENT: atraumatic, normocephalic


Respiratory exam: PRESENT: clear to auscultation jane - Anteriorly, symmetrical, 

unlabored.  ABSENT: rales, rhonchi, tachypnea, wheezes


Cardiovascular exam: PRESENT: RRR, +S1, +S2


GI/Abdominal exam: PRESENT: normal bowel sounds, soft.  ABSENT: distended, 

tenderness


Rectal exam: PRESENT: deferred


Neurological exam: PRESENT: altered - Patient is quite somnolent.  I did make 

several attempts to awaken the patient.  Nurses report that after discussing 

with the skilled facility this is possibly her baseline pattern of behavior.  

ABSENT: awake





Results


Laboratory Results: 


                                        





                                 03/05/20 05:08 





                                 03/05/20 05:08 





                                        











  03/04/20 03/04/20 03/04/20





  15:40 15:59 16:20


 


WBC    7.1


 


RBC    3.70 L


 


Hgb    9.2 L


 


Hct    30.4 L


 


MCV    82


 


MCH    25.0 L


 


MCHC    30.4 L


 


RDW    18.7 H


 


Plt Count    445


 


Seg Neutrophils %    51.5


 


Carbonic Acid  1.57 H  


 


HCO3/H2CO3 Ratio  21:1  


 


ABG pH  7.43  


 


ABG pCO2  52.0 H  


 


ABG pO2  53.9 L  


 


ABG HCO3  33.9 H  


 


ABG O2 Saturation  88.4 L  


 


ABG Base Excess  8.5  


 


FiO2  2L  


 


Sodium   


 


Potassium   


 


Chloride   


 


Carbon Dioxide   


 


Anion Gap   


 


BUN   


 


Creatinine   


 


Est GFR (African Amer)   


 


Glucose   


 


Calcium   


 


Magnesium   


 


Total Bilirubin   


 


AST   


 


Alkaline Phosphatase   


 


Total Protein   


 


Albumin   


 


Urine Color   STRAW 


 


Urine Appearance   CLEAR 


 


Urine pH   8.0 


 


Ur Specific Gravity   1.010 


 


Urine Protein   100 H 


 


Urine Glucose (UA)   NEGATIVE 


 


Urine Ketones   TRACE H 


 


Urine Blood   NEGATIVE 


 


Urine Nitrite   NEGATIVE 


 


Ur Leukocyte Esterase   NEGATIVE 


 


Urine WBC (Auto)   1 


 


Urine RBC (Auto)   4 














  03/04/20 03/05/20 03/05/20





  16:20 05:08 05:08


 


WBC   5.2 


 


RBC   3.67 L 


 


Hgb   9.6 L 


 


Hct   29.5 L 


 


MCV   80 


 


MCH   26.2 L 


 


MCHC   32.6 


 


RDW   19.5 H 


 


Plt Count   422 


 


Seg Neutrophils %   71.3 


 


Carbonic Acid   


 


HCO3/H2CO3 Ratio   


 


ABG pH   


 


ABG pCO2   


 


ABG pO2   


 


ABG HCO3   


 


ABG O2 Saturation   


 


ABG Base Excess   


 


FiO2   


 


Sodium  142.6   142.1


 


Potassium  4.5   4.9


 


Chloride  97 L   98


 


Carbon Dioxide  35 H   36 H


 


Anion Gap  11   8


 


BUN  12   12


 


Creatinine  0.89   0.77


 


Est GFR ( Amer)  > 60   > 60


 


Glucose  134 H   143 H


 


Calcium  9.8   9.8


 


Magnesium    2.1


 


Total Bilirubin  0.3  


 


AST  18  


 


Alkaline Phosphatase  78  


 


Total Protein  7.3  


 


Albumin  3.4 L  


 


Urine Color   


 


Urine Appearance   


 


Urine pH   


 


Ur Specific Gravity   


 


Urine Protein   


 


Urine Glucose (UA)   


 


Urine Ketones   


 


Urine Blood   


 


Urine Nitrite   


 


Ur Leukocyte Esterase   


 


Urine WBC (Auto)   


 


Urine RBC (Auto)   








                                        











  03/04/20 03/04/20





  16:20 16:20


 


Creatine Kinase  34 


 


Troponin I   < 0.012











Impressions: 


                                        





Chest X-Ray  03/04/20 00:00


IMPRESSION:  1. Cardiomegaly without pulmonary edema.


2. Asymmetric left retrocardiac opacities.  Clinical correlate to exclude a pne

umonia is recommended.


 














Assessment and Plan





- Diagnosis


(1) COPD exacerbation


Is this a current diagnosis for this admission?: Yes   


Plan: 





Wheezing on exam consistent with COPD exacerbation, no discrete crackles noted 

on exam but somewhat limited exam quality due to habitus


Schedule duo nebs


Azithromycin


We will hold off on broader antibiotics for now as she has no fever and a prerna

l WBC and was recently given a course of antibiotics at outside hospital


Check PVL BLE to rule out DVT and d-dimer given her prolonged immobility she is

at risk for DVT/PE; get CTA chest in a.m. if these tests are positive and treat 

appropriately


Prednisone 40 mg daily for 5 days


3/5/2020


The patient is somnolent.  This could be related to retained carbon dioxide.  I 

will check a blood gas.  Nursing reports that this may be her baseline.  We will

continue her current treatment regimen including nebulizer treatments and 

steroids.  She was on BiPAP yesterday.  Currently on nasal cannula.  Continue 

oxygen supplementation to keep saturation 88 to 92%.








(2) T2DM (type 2 diabetes mellitus)


Qualifiers: 


   Diabetes mellitus long term insulin use: with long term use   Diabetes 

mellitus complication status: with hyperglycemia   Qualified Code(s): E11.65 - 

Type 2 diabetes mellitus with hyperglycemia; Z79.4 - Long term (current) use of 

insulin   


Is this a current diagnosis for this admission?: Yes   


Plan: 





Accu-Cheks


Continue home Lantus


Low-dose correctional insulin


3/5/2020


Serum glucose is about the same.  Accu-Cheks are slightly higher.  Continue 

current regimen and adjust medications based on sliding scale requirements.








(3) CHF (congestive heart failure)


Qualifiers: 


   Heart failure type: unspecified 


Is this a current diagnosis for this admission?: Yes   


Plan: 





Unspecified CHF, unclear if diastolic or systolic


Consider requesting outside records in a.m.


Hold off on IV fluids unless absolutely indicated to avoid volume overload


3/5/2020


No evidence of overt failure.  We will continue current regimen at this time.








(4) Hypothyroidism


Qualifiers: 


   Hypothyroidism type: unspecified   Qualified Code(s): E03.9 - Hypothyroidism,

unspecified   


Is this a current diagnosis for this admission?: Yes   


Plan: 





Synthroid continued


3/5/2020


Continue current dose of levothyroxine.  If somnolence persists consider 

rechecking thyroid laboratory studies.








(5) Severe dementia


Is this a current diagnosis for this admission?: Yes   


Plan: 





Daughter is M POA


Bedbound at baseline per ED staff


3/5/2020


No acute intervention at this time as this appears to be the patient's baseline.








(6) History of recent hospitalization


Is this a current diagnosis for this admission?: Yes   


Plan: 





Recently treated for pneumonia at [facility] then sent to SNF 3/4


3/5/2020


Respiratory status is better than on admission.  Continue current treatment for 

pneumonia.  If this is truly her baseline then strong consideration needs to be 

given to review CODE STATUS and realistic expectations with the patient's 

family.








- Time


Time Spent with patient: Less than 15 minutes


Medications reviewed and adjusted accordingly: Yes


Anticipated discharge: SNF

## 2020-03-06 VITALS — DIASTOLIC BLOOD PRESSURE: 82 MMHG | SYSTOLIC BLOOD PRESSURE: 139 MMHG

## 2020-03-06 RX ADMIN — IPRATROPIUM BROMIDE AND ALBUTEROL SULFATE SCH: 2.5; .5 SOLUTION RESPIRATORY (INHALATION) at 12:16

## 2020-03-06 RX ADMIN — Medication SCH MG: at 09:08

## 2020-03-06 RX ADMIN — GLIMEPIRIDE SCH MG: 4 TABLET ORAL at 09:08

## 2020-03-06 RX ADMIN — GABAPENTIN SCH MG: 300 CAPSULE ORAL at 06:19

## 2020-03-06 RX ADMIN — HEPARIN SODIUM SCH UNIT: 5000 INJECTION, SOLUTION INTRAVENOUS; SUBCUTANEOUS at 06:19

## 2020-03-06 RX ADMIN — IPRATROPIUM BROMIDE AND ALBUTEROL SULFATE SCH ML: 2.5; .5 SOLUTION RESPIRATORY (INHALATION) at 15:38

## 2020-03-06 RX ADMIN — METOPROLOL TARTRATE SCH MG: 25 TABLET, FILM COATED ORAL at 09:08

## 2020-03-06 RX ADMIN — INSULIN LISPRO SCH UNIT: 100 INJECTION, SOLUTION INTRAVENOUS; SUBCUTANEOUS at 17:14

## 2020-03-06 RX ADMIN — INSULIN LISPRO SCH UNIT: 100 INJECTION, SOLUTION INTRAVENOUS; SUBCUTANEOUS at 11:45

## 2020-03-06 RX ADMIN — INSULIN LISPRO SCH UNIT: 100 INJECTION, SOLUTION INTRAVENOUS; SUBCUTANEOUS at 00:15

## 2020-03-06 RX ADMIN — IPRATROPIUM BROMIDE AND ALBUTEROL SULFATE SCH ML: 2.5; .5 SOLUTION RESPIRATORY (INHALATION) at 04:05

## 2020-03-06 RX ADMIN — ACETAMINOPHEN PRN MG: 325 TABLET ORAL at 16:25

## 2020-03-06 RX ADMIN — PANTOPRAZOLE SODIUM SCH MG: 40 TABLET, DELAYED RELEASE ORAL at 09:09

## 2020-03-06 RX ADMIN — ASPIRIN SCH MG: 81 TABLET, CHEWABLE ORAL at 09:08

## 2020-03-06 RX ADMIN — LEVOTHYROXINE SODIUM SCH MG: 50 TABLET ORAL at 06:19

## 2020-03-06 RX ADMIN — AZITHROMYCIN SCH MG: 250 TABLET, FILM COATED ORAL at 09:08

## 2020-03-06 RX ADMIN — INSULIN LISPRO SCH UNIT: 100 INJECTION, SOLUTION INTRAVENOUS; SUBCUTANEOUS at 06:18

## 2020-03-06 RX ADMIN — FLUTICASONE FUROATE AND VILANTEROL TRIFENATATE SCH INHALER: 200; 25 POWDER RESPIRATORY (INHALATION) at 09:10

## 2020-03-06 RX ADMIN — PREDNISONE SCH MG: 20 TABLET ORAL at 09:08

## 2020-03-06 RX ADMIN — HEPARIN SODIUM SCH UNIT: 5000 INJECTION, SOLUTION INTRAVENOUS; SUBCUTANEOUS at 13:24

## 2020-03-06 RX ADMIN — GABAPENTIN SCH MG: 300 CAPSULE ORAL at 13:23

## 2020-03-06 RX ADMIN — FERROUS SULFATE TAB 325 MG (65 MG ELEMENTAL FE) SCH MG: 325 (65 FE) TAB at 09:08

## 2020-03-06 RX ADMIN — IPRATROPIUM BROMIDE AND ALBUTEROL SULFATE SCH ML: 2.5; .5 SOLUTION RESPIRATORY (INHALATION) at 08:17

## 2020-03-06 RX ADMIN — MULTIVITAMIN TABLET SCH TAB: TABLET at 17:14

## 2020-03-06 RX ADMIN — ZINC OXIDE SCH APPLIC: 200 OINTMENT TOPICAL at 09:09

## 2020-03-06 RX ADMIN — PANTOPRAZOLE SODIUM SCH MG: 40 TABLET, DELAYED RELEASE ORAL at 17:14

## 2020-03-06 RX ADMIN — MONTELUKAST SODIUM SCH MG: 10 TABLET, FILM COATED ORAL at 09:08

## 2020-03-06 NOTE — PDOC TRANSFER SUMMARY
Impression





- Admit/DC Date/PCP


Admission Date/Primary Care Provider: 


  03/04/20 19:19





  RAMAN REYES MD





Discharge Date: 03/06/20





- Discharge Diagnosis


(1) COPD exacerbation


Is this a current diagnosis for this admission?: Yes   





(2) History of recent hospitalization


Is this a current diagnosis for this admission?: Yes   





(3) Hypothyroidism


Is this a current diagnosis for this admission?: Yes   





(4) Severe dementia


Is this a current diagnosis for this admission?: Yes   





(5) T2DM (type 2 diabetes mellitus)


Is this a current diagnosis for this admission?: Yes   





- Additional Information


Resuscitation Status: Full Code


Referrals: 


RAMAN REYES MD [Primary Care Provider] - Follow up as needed


Prescriptions: 


Azithromycin [Zithromax 250 mg Tablet] 250 mg PO ASDIR PRN 4 Days #4 tablet


 PRN Reason: 


Home Medications: 








Acetaminophen [Tylenol 325 mg Tablet] 650 mg PO Q6HP PRN 03/04/20 


Albuterol Sulfate [Ventolin 0.083% Neb 2.5 mg/3 mL Ampul] 1 vial NEB Q6HP PRN 

03/04/20 


Albuterol Sulfate [Ventolin Hfa 8 gm Mdi] 2 puff IH Q4HP PRN 03/04/20 


Aspirin [Aspirin 81 mg Chewable Tablet] 81 mg PO DAILY 03/04/20 


Benzonatate [Tessalon Perles 100 mg Capsule] 100 mg PO TIDP PRN 03/04/20 


Budesonide/Formoterol Fumarate [Symbicort -4.5 mcg Inhaler 6 gm] 2 puff 

IH Q12 03/04/20 


Ferrous Sulfate [Feosol 325 mg Tablet] 325 mg PO DAILY 03/04/20 


Gabapentin [Neurontin 300 mg Capsule] 600 mg PO Q8 03/04/20 


Glimepiride [Amaryl 4 mg Tablet] 4 mg PO DAILY 03/04/20 


Insulin Glargine/Lixisenatide [Soliqua 100 Unit-33 Mcg/ml Pen] 28 units SQ QHS 

03/04/20 


Levothyroxine Sodium [Synthroid 0.05 mg Tablet] 0.05 mg PO Q6AM 03/04/20 


Metoprolol Tartrate [Lopressor 25 mg Tablet] 12.5 mg PO Q12 03/04/20 


Mirtazapine 30 mg PO QHS 03/04/20 


Montelukast Sodium [Singulair 10 mg Tablet] 10 mg PO DAILY 03/04/20 


Multivit,Calc,Mins/Iron/Folic [Thera M Plus Tablet] 1 tab PO QPM 03/04/20 


Omeprazole 40 mg PO BID 03/04/20 


Thiamine HCl [Thiamine 100 mg Tablet] 100 mg PO DAILY 03/04/20 


Zinc Oxide [Skin Protectant] 1 applic TOP Q12 03/04/20 


Azithromycin [Zithromax 250 mg Tablet] 250 mg PO ASDIR PRN 4 Days #4 tablet 

03/06/20 


Prednisone [Deltasone 20 mg Tablet] 40 mg PO DAILY 3 Days  tablet 03/06/20 











History of Present Illiness


History of Present Illness: 


MK SHELL is a 87 year old female with past medical history of COPD, 

severe community-acquired pneumonia, severe dementia, T2DM who presented from 

nursing facility after 1 day of acute respiratory distress with shortness of 

breath and HERNANDEZ.  Patient was reportedly [facility] 

approximately 1 month for severe pneumonia and was released to nearby nursing 

facility on day of admission.  History cannot be obtained by patient due to 

severe dementia, history primarily taken from ED staff and records.  Patient 

initially placed on BiPAP in ED but she did not tolerate this at all and this 

was removed.  She became more stable on only 2 L nasal cannula which she uses at

home already chronically.  She does not look toxic on exam and is breathing 

comfortably on 2 L nasal cannula.  He is afebrile and has a normal WBC.  Started

on azithromycin, steroids, scheduled nebulizer treatments.











Hospital Course


Hospital Course: 


Patient was admitted to the hospital for evaluation of respiratory distress.  On

initial presentation, patient was seen respiratory distress and required to be 

shortly placed on BiPAP.  Initially noted to be mildly hypoxic at 90% on room 

air.  She required BiPAP only briefly which was subsequently discontinued.  She 

was thought to be having a COPD exacerbation.  Chest x-ray showed mild remnant 

opacities indicative of resolving recent pneumonia.  No evidence of leukocytosis

on lab work or other findings to suggest new or incompletely treated pneumonia. 

Patient is being treated for COPD exacerbation and has received nebulizers as 

well as some doses of steroids and azithromycin.  Patient has tolerated on room 

air for the past day and is comfortable today.  Patient is to continue breathing

treatments with albuterol nebulizer 3 times a day for the next 3 days after 

which only given on an as needed basis.  Patient is being discharged with 

prednisone to continue for 3 more days as well as azithromycin to continue for 4

days.  Patient has documentation of prior chronic congestive heart failure EF 

unknown but does not seem to be having any acute exacerbation of her CHF at this

time.





Physical Exam


Vital Signs: 


                                        











Temp Pulse Resp BP Pulse Ox


 


 97.8 F   79   17   145/71 H  97 


 


 03/06/20 12:04  03/06/20 12:04  03/06/20 12:04  03/06/20 12:04  03/06/20 12:04








                                 Intake & Output











 03/05/20 03/06/20 03/07/20





 06:59 06:59 06:59


 


Intake Total  594 


 


Output Total 750 500 


 


Balance -750 94 


 


Weight 97.7 kg 99.9 kg 











General appearance: PRESENT: no acute distress, cooperative


Neck exam: ABSENT: JVD


Respiratory exam: PRESENT: clear to auscultation jane, unlabored.  ABSENT: 

retraction, tachypnea, wheezes


Cardiovascular exam: PRESENT: RRR, +S1, +S2.  ABSENT: tachycardia


Neurological exam: PRESENT: alert, awake





Results


Laboratory Results: 


                                        











WBC  5.2 10^3/uL (4.0-10.5)   03/05/20  05:08    


 


RBC  3.67 10^6/uL (3.72-5.28)  L  03/05/20  05:08    


 


Hgb  9.6 g/dL (12.0-15.5)  L  03/05/20  05:08    


 


Hct  29.5 % (36.0-47.0)  L  03/05/20  05:08    


 


MCV  80 fl (80-97)   03/05/20  05:08    


 


MCH  26.2 pg (27.0-33.4)  L  03/05/20  05:08    


 


MCHC  32.6 g/dL (32.0-36.0)   03/05/20  05:08    


 


RDW  19.5 % (11.5-14.0)  H  03/05/20  05:08    


 


Plt Count  422 10^3/uL (150-450)   03/05/20  05:08    


 


Lymph % (Auto)  25.2 % (13-45)   03/05/20  05:08    


 


Mono % (Auto)  1.9 % (3-13)  L  03/05/20  05:08    


 


Eos % (Auto)  1.0 % (0-6)   03/05/20  05:08    


 


Baso % (Auto)  0.6 % (0-2)   03/05/20  05:08    


 


Absolute Neuts (auto)  3.7 10^3/uL (1.7-8.2)   03/05/20  05:08    


 


Absolute Lymphs (auto)  1.3 10^3/uL (0.5-4.7)   03/05/20  05:08    


 


Absolute Monos (auto)  0.1 10^3/uL (0.1-1.4)   03/05/20  05:08    


 


Absolute Eos (auto)  0.1 10^3/uL (0.0-0.6)   03/05/20  05:08    


 


Absolute Basos (auto)  0.0 10^3/uL (0.0-0.2)   03/05/20  05:08    


 


Seg Neutrophils %  71.3 % (42-78)   03/05/20  05:08    


 


D-Dimer  2.73 ug/mL (0.00-0.50)  H  03/04/20  21:44    


 


Carbonic Acid  1.62 mmol/L (1.05-1.35)  H  03/05/20  14:25    


 


HCO3/H2CO3 Ratio  21:1   03/05/20  14:25    


 


ABG pH  7.43  (7.35-7.45)   03/05/20  14:25    


 


ABG pCO2  53.8 mmHg (35-45)  H  03/05/20  14:25    


 


ABG pO2  86.8 mmHg ()   03/05/20  14:25    


 


ABG HCO3  34.9 mmol/L (20-24)  H  03/05/20  14:25    


 


ABG Total CO2  36.6 mmol/L (21-25)  H  03/05/20  14:25    


 


ABG O2 Saturation  96.6 % (94-98)   03/05/20  14:25    


 


ABG Base Excess  9.3 mmol/L  03/05/20  14:25    


 


FiO2  2L   03/05/20  14:25    


 


Sodium  142.1 mmol/L (137-145)   03/05/20  05:08    


 


Potassium  4.9 mmol/L (3.6-5.0)   03/05/20  05:08    


 


Chloride  98 mmol/L ()   03/05/20  05:08    


 


Carbon Dioxide  36 mmol/L (22-30)  H  03/05/20  05:08    


 


Anion Gap  8  (5-19)   03/05/20  05:08    


 


BUN  12 mg/dL (7-20)   03/05/20  05:08    


 


Creatinine  0.77 mg/dL (0.52-1.25)   03/05/20  05:08    


 


Est GFR ( Amer)  > 60  (>60)   03/05/20  05:08    


 


Est GFR (MDRD) Non-Af  > 60  (>60)   03/05/20  05:08    


 


Glucose  143 mg/dL ()  H  03/05/20  05:08    


 


POC Glucose  223 mg/dL ()  H  03/06/20  11:34    


 


Calcium  9.8 mg/dL (8.4-10.2)   03/05/20  05:08    


 


Magnesium  2.1 mg/dL (1.6-2.3)   03/05/20  05:08    


 


Total Bilirubin  0.3 mg/dL (0.2-1.3)   03/04/20  16:20    


 


Direct Bilirubin  0.3 mg/dL (0.0-0.4)   03/04/20  16:20    


 


Neonat Total Bilirubin  Not Reportable   03/04/20  16:20    


 


Neonat Direct Bilirubin  Not Reportable   03/04/20  16:20    


 


Neonat Indirect Bili  Not Reportable   03/04/20  16:20    


 


AST  18 U/L (14-36)   03/04/20  16:20    


 


ALT  9 U/L (<35)   03/04/20  16:20    


 


Alkaline Phosphatase  78 U/L ()   03/04/20  16:20    


 


Creatine Kinase  34 U/L ()   03/04/20  16:20    


 


Troponin I  < 0.012 ng/mL  03/04/20  16:20    


 


Total Protein  7.3 g/dL (6.3-8.2)   03/04/20  16:20    


 


Albumin  3.4 g/dL (3.5-5.0)  L  03/04/20  16:20    


 


Urine Color  STRAW   03/04/20  15:59    


 


Urine Appearance  CLEAR   03/04/20  15:59    


 


Urine pH  8.0  (5.0-9.0)   03/04/20  15:59    


 


Ur Specific Gravity  1.010   03/04/20  15:59    


 


Urine Protein  100 mg/dL (NEGATIVE)  H  03/04/20  15:59    


 


Urine Glucose (UA)  NEGATIVE mg/dL (NEGATIVE)   03/04/20  15:59    


 


Urine Ketones  TRACE mg/dL (NEGATIVE)  H  03/04/20  15:59    


 


Urine Blood  NEGATIVE  (NEGATIVE)   03/04/20  15:59    


 


Urine Nitrite  NEGATIVE  (NEGATIVE)   03/04/20  15:59    


 


Urine Bilirubin  NEGATIVE  (NEGATIVE)   03/04/20  15:59    


 


Urine Urobilinogen  NEGATIVE mg/dL (<2.0)   03/04/20  15:59    


 


Ur Leukocyte Esterase  NEGATIVE  (NEGATIVE)   03/04/20  15:59    


 


Urine WBC (Auto)  1 /HPF  03/04/20  15:59    


 


Urine RBC (Auto)  4 /HPF  03/04/20  15:59    


 


Squamous Epi Cells Auto  1 /HPF  03/04/20  15:59    


 


Urine Mucus (Auto)  RARE /LPF  03/04/20  15:59    


 


Urine Ascorbic Acid  NEGATIVE  (NEGATIVE)   03/04/20  15:59    








                                        











  03/04/20





  16:20


 


Troponin I  < 0.012











Impressions: 


                                        





Chest X-Ray  03/04/20 00:00


IMPRESSION:  1. Cardiomegaly without pulmonary edema.


2. Asymmetric left retrocardiac opacities.  Clinical correlate to exclude a 

pneumonia is recommended.


 














Plan


Time Spent: Less than 30 Minutes





Stroke


Is this a Stroke Patient?: No





Acute Heart Failure





- **


Is this a Heart Failure Patient?: Yes


Documentation of LVEF assessment?: No, Document reason - Not in active heart 

failure.  She just has an apparent history of CHF.


LVEF < 40%?: No- if no continue to question #3


3. Anticoagulant therapy for permanect/persistent/paraoxysmal Afib or Aflutter: 

N/A

## 2020-03-25 ENCOUNTER — HOSPITAL ENCOUNTER (OUTPATIENT)
Dept: HOSPITAL 62 - ER | Age: 85
Setting detail: OBSERVATION
LOS: 1 days | Discharge: NURSING, REHAB OR LONG-TERM CARE | End: 2020-03-26
Attending: FAMILY MEDICINE | Admitting: FAMILY MEDICINE
Payer: MEDICARE

## 2020-03-25 DIAGNOSIS — E11.65: ICD-10-CM

## 2020-03-25 DIAGNOSIS — I11.0: ICD-10-CM

## 2020-03-25 DIAGNOSIS — Z87.01: ICD-10-CM

## 2020-03-25 DIAGNOSIS — R00.0: ICD-10-CM

## 2020-03-25 DIAGNOSIS — E66.9: ICD-10-CM

## 2020-03-25 DIAGNOSIS — Z79.4: ICD-10-CM

## 2020-03-25 DIAGNOSIS — R07.9: Primary | ICD-10-CM

## 2020-03-25 DIAGNOSIS — E03.9: ICD-10-CM

## 2020-03-25 DIAGNOSIS — J44.9: ICD-10-CM

## 2020-03-25 DIAGNOSIS — F03.90: ICD-10-CM

## 2020-03-25 DIAGNOSIS — K21.9: ICD-10-CM

## 2020-03-25 DIAGNOSIS — I50.9: ICD-10-CM

## 2020-03-25 DIAGNOSIS — Z79.82: ICD-10-CM

## 2020-03-25 DIAGNOSIS — Z79.899: ICD-10-CM

## 2020-03-25 LAB
ADD MANUAL DIFF: NO
ALBUMIN SERPL-MCNC: 3.1 G/DL (ref 3.5–5)
ALP SERPL-CCNC: 101 U/L (ref 38–126)
ANION GAP SERPL CALC-SCNC: 4 MMOL/L (ref 5–19)
AST SERPL-CCNC: 25 U/L (ref 14–36)
BASOPHILS # BLD AUTO: 0.1 10^3/UL (ref 0–0.2)
BASOPHILS NFR BLD AUTO: 0.8 % (ref 0–2)
BILIRUB DIRECT SERPL-MCNC: 0.3 MG/DL (ref 0–0.4)
BILIRUB SERPL-MCNC: 0.3 MG/DL (ref 0.2–1.3)
BUN SERPL-MCNC: 17 MG/DL (ref 7–20)
CALCIUM: 9.7 MG/DL (ref 8.4–10.2)
CHLORIDE SERPL-SCNC: 95 MMOL/L (ref 98–107)
CO2 SERPL-SCNC: 38 MMOL/L (ref 22–30)
EOSINOPHIL # BLD AUTO: 0.4 10^3/UL (ref 0–0.6)
EOSINOPHIL NFR BLD AUTO: 4.5 % (ref 0–6)
ERYTHROCYTE [DISTWIDTH] IN BLOOD BY AUTOMATED COUNT: 19.1 % (ref 11.5–14)
GLUCOSE SERPL-MCNC: 195 MG/DL (ref 75–110)
HCT VFR BLD CALC: 30.8 % (ref 36–47)
HGB BLD-MCNC: 9.8 G/DL (ref 12–15.5)
LYMPHOCYTES # BLD AUTO: 2.5 10^3/UL (ref 0.5–4.7)
LYMPHOCYTES NFR BLD AUTO: 28.5 % (ref 13–45)
MCH RBC QN AUTO: 26.1 PG (ref 27–33.4)
MCHC RBC AUTO-ENTMCNC: 31.9 G/DL (ref 32–36)
MCV RBC AUTO: 82 FL (ref 80–97)
MONOCYTES # BLD AUTO: 0.5 10^3/UL (ref 0.1–1.4)
MONOCYTES NFR BLD AUTO: 5.9 % (ref 3–13)
NEUTROPHILS # BLD AUTO: 5.3 10^3/UL (ref 1.7–8.2)
NEUTS SEG NFR BLD AUTO: 60.3 % (ref 42–78)
PLATELET # BLD: 492 10^3/UL (ref 150–450)
POTASSIUM SERPL-SCNC: 4.9 MMOL/L (ref 3.6–5)
PROT SERPL-MCNC: 6.5 G/DL (ref 6.3–8.2)
RBC # BLD AUTO: 3.78 10^6/UL (ref 3.72–5.28)
TOTAL CELLS COUNTED % (AUTO): 100 %
WBC # BLD AUTO: 8.8 10^3/UL (ref 4–10.5)

## 2020-03-25 PROCEDURE — 93010 ELECTROCARDIOGRAM REPORT: CPT

## 2020-03-25 PROCEDURE — 83605 ASSAY OF LACTIC ACID: CPT

## 2020-03-25 PROCEDURE — 83690 ASSAY OF LIPASE: CPT

## 2020-03-25 PROCEDURE — 93005 ELECTROCARDIOGRAM TRACING: CPT

## 2020-03-25 PROCEDURE — 99285 EMERGENCY DEPT VISIT HI MDM: CPT

## 2020-03-25 PROCEDURE — 71045 X-RAY EXAM CHEST 1 VIEW: CPT

## 2020-03-25 PROCEDURE — 36415 COLL VENOUS BLD VENIPUNCTURE: CPT

## 2020-03-25 PROCEDURE — 85025 COMPLETE CBC W/AUTO DIFF WBC: CPT

## 2020-03-25 PROCEDURE — 82962 GLUCOSE BLOOD TEST: CPT

## 2020-03-25 PROCEDURE — 80053 COMPREHEN METABOLIC PANEL: CPT

## 2020-03-25 PROCEDURE — 84484 ASSAY OF TROPONIN QUANT: CPT

## 2020-03-25 PROCEDURE — 83735 ASSAY OF MAGNESIUM: CPT

## 2020-03-25 PROCEDURE — G0378 HOSPITAL OBSERVATION PER HR: HCPCS

## 2020-03-25 RX ADMIN — Medication SCH: at 13:16

## 2020-03-25 RX ADMIN — METOPROLOL TARTRATE SCH MG: 25 TABLET, FILM COATED ORAL at 22:28

## 2020-03-25 RX ADMIN — Medication SCH ML: at 22:29

## 2020-03-25 RX ADMIN — GABAPENTIN SCH MG: 300 CAPSULE ORAL at 22:28

## 2020-03-25 NOTE — EKG REPORT
SEVERITY:- ABNORMAL ECG -

SINUS TACHYCARDIA

PROBABLE INFERIOR INFARCT, OLD

:

Confirmed by: Samir Hou MD 25-Mar-2020 11:29:16

## 2020-03-25 NOTE — ER DOCUMENT REPORT
ED Cardiac





- General


Stated Complaint: CHEST PAIN


Time Seen by Provider: 03/25/20 10:32


Primary Care Provider: 


BRIAN HARPER MD [Primary Care Provider] - Follow up as needed


Information source: Patient, Emergency Med Personnel


Cannot obtain history due to: Dementia, Other - AGE & DIFFICULT TO UNDERSTAND


Notes: 





Patient brought in by EMS from [facility] with a complaint of chest pa

in substernal without radiation.  History and physical and review of systems are

all limited by patient's age and by the fact that she is difficult to 

understand.  EMS gave the patient aspirin 325 and nitroglycerin in route, with 

improvement of chest pain.  Apparently the patient had a pneumonia approximately

1 week ago.  She is not able to give me any further history and appears 

comfortable at this time.  She does have a history of CHF, COPD, DEMENTIA, HTN 

and diabetes.


TRAVEL OUTSIDE OF THE [place] IN LAST 30 DAYS: No





- Related Data


Allergies/Adverse Reactions: 


                                        





barley Allergy (Verified 03/04/20 12:20)


   


ranitidine Allergy (Verified 03/04/20 12:20)


   


sulfamethoxazole [From Bactrim] Allergy (Verified 03/04/20 12:20)


   


trimethoprim [From Bactrim] Allergy (Verified 03/04/20 12:20)


   


iodine Adverse Reaction (Verified 03/04/20 12:20)


   


shellfish derived Adverse Reaction (Verified 03/04/20 12:20)


   











Past Medical History





- General


Information source: Patient, Emergency Med Personnel





- Social History


Smoking Status: Unknown if Ever Smoked


Family History: Other - Family history is not available.





- Past Medical History


Cardiac Medical History: Reports: Hx Congestive Heart Failure, Hx 

Hypercholesterolemia, Hx Hypertension


Pulmonary Medical History: Reports: Hx COPD, Hx Pneumonia


Neurological Medical History: Reports: Hx Seizures


Endocrine Medical History: Reports: Hx Diabetes Mellitus Type 2, Hx 

Hypothyroidism


Renal/ Medical History: Reports: Hx Renal Insufficiency


GI Medical History: Reports: Hx Gastroesophageal Reflux Disease


Musculoskeletal Medical History: Reports Hx Arthritis, Reports Hx Gout


Psychiatric Medical History: Reports: Hx Dementia





Review of Systems





- Review of Systems


-: Yes ROS unobtainable due to patient's medical condition





Physical Exam





- Vital signs


Vitals: 


                                        











Resp Pulse Ox


 


 27 H  92 


 


 03/25/20 10:35  03/25/20 10:35











Interpretation: Tachycardic





- General


General appearance: Appears well





- HEENT


Head: Normocephalic


Nasal: Normal


Mouth/Lips: Normal


Mucous membranes: Dry


Pharynx: Normal


Neck: Normal, Supple





- Respiratory


Respiratory status: No respiratory distress


Chest status: Nontender


Breath sounds: Normal


Chest palpation: Normal





- Cardiovascular


Rhythm: Regular, Tachycardia


Murmur: No





- Abdominal


Inspection: Normal


Tenderness: Nontender.  No: Guarding, Rebound





- Back


Back: Normal.  No: Tender, CVA tenderness





- Extremities


General upper extremity: Normal inspection, Normal ROM


General lower extremity: Normal inspection, Normal ROM





- Neurological


Neuro grossly intact: Yes


Cognition: Other


Cranial nerves: Normal





- Psychological


Associated symptoms: Normal affect, Normal mood





- Skin


Skin Temperature: Warm


Skin Moisture: Dry





Course





- Re-evaluation


Re-evalutation: 





03/25/20 10:52


EKG PER ME SINUS TACHYCARDIA 113 WITH NS ST CHANGES AND MILD ARTIFACT.


03/25/20 12:13


LABS REVIEWED.


CXR TRACE L PLEURAL EFFUSION PER RADIOLOGIST.


03/25/20 12:24


DISCUSSED CASE IN DETAIL WITH DR. BECKMAN, WHO WILL SEE PT FOR ADMISSION. SHE IS

STABLE.





- Vital Signs


Vital signs: 


                                        











Temp Pulse Resp BP Pulse Ox


 


 97.9 F      29 H  113/63   96 


 


 03/25/20 11:01     03/25/20 11:01  03/25/20 11:01  03/25/20 11:01














- Laboratory


Result Diagrams: 


                                 03/25/20 10:45





                                 03/25/20 10:45


Laboratory results interpreted by me: 


                                        











  03/25/20 03/25/20





  10:45 10:45


 


Hgb  9.8 L 


 


Hct  30.8 L 


 


MCH  26.1 L 


 


MCHC  31.9 L 


 


RDW  19.1 H 


 


Plt Count  492 H 


 


Sodium   136.6 L


 


Chloride   95 L


 


Carbon Dioxide   38 H


 


Anion Gap   4 L


 


Glucose   195 H


 


Albumin   3.1 L














- Diagnostic Test


Radiology reviewed: Image reviewed, Reports reviewed





Discharge





- Discharge


Clinical Impression: 


Chest pain


Qualifiers:


 Chest pain type: unspecified Qualified Code(s): R07.9 - Chest pain, unspecified





Anemia


Qualifiers:


 Anemia type: other cause Other causes of anemia: other cause, not classified 

Qualified Code(s): D64.89 - Other specified anemias





Condition: Stable


Disposition: ADMITTED AS INPATIENT


Admitting Provider: Ivon (Hospitalist)


Unit Admitted: Telemetry


Referrals: 


BRIAN HARPER MD [Primary Care Provider] - Follow up as needed

## 2020-03-25 NOTE — RADIOLOGY REPORT (SQ)
EXAM DESCRIPTION:  CHEST SINGLE VIEW



COMPLETED DATE/TIME:  3/25/2020 11:18 am



REASON FOR STUDY:  CHEST PAIN



COMPARISON:  3/4/2020



EXAM PARAMETERS:  NUMBER OF VIEWS: One view.

TECHNIQUE: Single frontal radiographic view of the chest acquired.

RADIATION DOSE: NA

LIMITATIONS: None.



FINDINGS:  LUNGS AND PLEURA: Trace left pleural effusion.  No infiltrate.

MEDIASTINUM AND HILAR STRUCTURES: No masses.  Contour normal.

HEART AND VASCULAR STRUCTURES: Stable heart size.  Normal vasculature.

BONES: No acute findings.

HARDWARE: None in the chest.

OTHER: No other significant finding.



IMPRESSION:  Trace left pleural effusion.



TECHNICAL DOCUMENTATION:  JOB ID:  [id number]

 2011 [employer]- All Rights Reserved



Reading location - IP/workstation name: [email or web address]

## 2020-03-25 NOTE — PDOC CONSULTATION
Consultation


Consult Date: 03/25/20


Provider Consulted: SJ KELLER


Consult reason:: Chest pain





History of Present Illness


Admission Date/PCP: 


  03/25/20 12:31





  BRIAN HARPER MD





Patient complains of: Chest pain


History of Present Illness: 


MK SHELL is a 87 year old female


With the following active problems


1.  Diabetes mellitus


2.  Obesity


3.  Dementia





Recent hospital admission and treatment for community-acquired pneumonia.  

Patient is a poor historian with difficult recall.  In my encounter with the 

patient she reports abnormal-funny sensation in the left lower chest not exactly

characterize this chest pain.  Since being brought into the hospital the sensati

on is passed.  She does not specifically endorse chest pain or discomfort or 

dyspnea.





Does not report present tobacco use.  Diminished recall.  Family history is not 

reliable.





Past Medical History


Cardiac Medical History: Reports: Congestive Heart Failure, Hyperlipidema, 

Hypertension


Pulmonary Medical History: Reports: Chronic Obstructive Pulmonary Disease 

(COPD), Pneumonia


Neurological Medical History: Reports: Seizures


Endocrine Medical History: Reports: Diabetes Mellitus Type 2, Hypothyroidism


GI Medical History: Reports: Gastroesophageal Reflux Disease


Musculoskeltal Medical History: Reports: Arthritis, Gout


Psychiatric Medical History: Reports: Dementia





Social History


Smoking Status: Unknown if Ever Smoked





Family History


Family History: Reviewed & Not Pertinent, Other - Family history is not 

available.


Parental Family History Reviewed: No - Unable to recall.  Dementia


Children Family History Reviewed: NA


Sibling(s) Family History Reviewed.: NA





Medication/Allergy


Home Medications: 








Acetaminophen [Tylenol 325 mg Tablet] 650 mg PO Q6HP PRN 03/04/20 


Albuterol Sulfate [Ventolin 0.083% Neb 2.5 mg/3 mL Ampul] 1 vial NEB Q6HP PRN 

03/04/20 


Albuterol Sulfate [Ventolin Hfa 8 gm Mdi] 2 puff IH Q4HP PRN 03/04/20 


Aspirin [Aspirin 81 mg Chewable Tablet] 81 mg PO DAILY 03/04/20 


Benzonatate [Tessalon Perles 100 mg Capsule] 100 mg PO TIDP PRN 03/04/20 


Budesonide/Formoterol Fumarate [Symbicort -4.5 mcg Inhaler 6 gm] 2 puff 

IH Q12 03/04/20 


Ferrous Sulfate [Feosol 325 mg Tablet] 325 mg PO DAILY 03/04/20 


Gabapentin [Neurontin 300 mg Capsule] 600 mg PO Q8 03/04/20 


Glimepiride [Amaryl 4 mg Tablet] 4 mg PO DAILY 03/04/20 


Insulin Glargine/Lixisenatide [Soliqua 100 Unit-33 Mcg/ml Pen] 28 units SQ QHS 

03/04/20 


Levothyroxine Sodium [Synthroid 0.05 mg Tablet] 0.05 mg PO Q6AM 03/04/20 


Metoprolol Tartrate [Lopressor 25 mg Tablet] 12.5 mg PO Q12 03/04/20 


Mirtazapine 30 mg PO QHS 03/04/20 


Montelukast Sodium [Singulair 10 mg Tablet] 10 mg PO DAILY 03/04/20 


Multivit,Calc,Mins/Iron/Folic [Thera M Plus Tablet] 1 tab PO QPM 03/04/20 


Omeprazole 40 mg PO BID 03/04/20 


Thiamine HCl [Thiamine 100 mg Tablet] 100 mg PO DAILY 03/04/20 


Zinc Oxide [Skin Protectant] 1 applic TOP Q12 03/04/20 


Azithromycin [Zithromax 250 mg Tablet] 250 mg PO ASDIR PRN 4 Days #4 tablet 

03/06/20 


Prednisone [Deltasone 20 mg Tablet] 40 mg PO DAILY 3 Days  tablet 03/06/20 








Allergies/Adverse Reactions: 


                                        





barley Allergy (Verified 03/04/20 12:20)


   


ranitidine Allergy (Verified 03/04/20 12:20)


   


sulfamethoxazole [From Bactrim] Allergy (Verified 03/04/20 12:20)


   


trimethoprim [From Bactrim] Allergy (Verified 03/04/20 12:20)


   


iodine Adverse Reaction (Verified 03/04/20 12:20)


   


shellfish derived Adverse Reaction (Verified 03/04/20 12:20)


   











Review of Systems


ROS unobtainable: Other - Not reliable review of systems owing to poor recall


Constitutional: PRESENT: as per HPI


Breasts: PRESENT: as per HPI


Cardiovascular: PRESENT: as per HPI





Physical Exam


Vital Signs: 


                                        











Temp Pulse Resp BP Pulse Ox


 


 97.9 F      28 H  113/61   98 


 


 03/25/20 11:01     03/25/20 13:01  03/25/20 13:01  03/25/20 12:03








                                 Intake & Output











 03/24/20 03/25/20 03/26/20





 06:59 06:59 06:59


 


Intake Total   250


 


Balance   250


 


Weight   103.6 kg











General appearance: PRESENT: cooperative, obese, well-developed, well-nourished


Head exam: PRESENT: atraumatic, normocephalic


Eye exam: PRESENT: conjunctiva pale, EOMI


Mouth exam: PRESENT: moist


Respiratory exam: PRESENT: crackles, decreased breath sounds, symmetrical, 

unlabored


Cardiovascular exam: PRESENT: RRR, +S1, +S2


Pulses: PRESENT: normal radial pulses


GI/Abdominal exam: PRESENT: soft


Rectal exam: PRESENT: deferred


Neurological exam: PRESENT: alert, awake, oriented to person, oriented to place


Psychiatric exam: PRESENT: appropriate affect


Skin exam: PRESENT: dry, intact, normal color





Results


Laboratory Results: 


                                        





                                 03/25/20 10:45 





                                 03/25/20 10:45 





                                        











  03/25/20 03/25/20 03/25/20





  10:45 10:45 10:45


 


WBC  8.8  


 


RBC  3.78  


 


Hgb  9.8 L  


 


Hct  30.8 L  


 


MCV  82  


 


MCH  26.1 L  


 


MCHC  31.9 L  


 


RDW  19.1 H  


 


Plt Count  492 H  


 


Seg Neutrophils %  60.3  


 


Sodium   136.6 L 


 


Potassium   4.9 


 


Chloride   95 L 


 


Carbon Dioxide   38 H 


 


Anion Gap   4 L 


 


BUN   17 


 


Creatinine   0.84 


 


Est GFR ( Amer)   > 60 


 


Glucose   195 H 


 


Lactic Acid    1.0


 


Calcium   9.7 


 


Magnesium   2.2 


 


Total Bilirubin   0.3 


 


AST   25 


 


Alkaline Phosphatase   101 


 


Total Protein   6.5 


 


Albumin   3.1 L 


 


Lipase   165.5 








                                        











  03/25/20





  10:45


 


Troponin I  < 0.012











EKG Comments: 





Chest x-ray 3/25/2020


Trace left pleural effusion.





Twelve-lead EKG 3/4/2020


Sinus rhythm, 93 bpm





Twelve-lead EKG 3/25/2020


Independently viewed by me.


Sinus tachycardia 113 bpm


Impressions: 


                                        





Chest X-Ray  03/25/20 10:42


IMPRESSION:  Trace left pleural effusion.


 











Status: Image reviewed by me





Assessment & Plan





- Diagnosis


(1) Chest pain


Qualifiers: 


   Chest pain type: unspecified   Qualified Code(s): R07.9 - Chest pain, 

unspecified   


Is this a current diagnosis for this admission?: Yes   


Plan: 


Not sure of the symptoms described as chest pain.  Patient describes funny 

sensations in the chest which have now resolved.


EKG is nondiagnostic for myocardial ischemia


Cardiac biomarker x1 is negative


Absent ongoing chest pain would not pursue work-up for same.





May be reasonable to monitor the patient due to diminished recall and unreliable

history as to what her exact complaint is.

## 2020-03-25 NOTE — PDOC H&P
History of Present Illness


Admission Date/PCP: 


  03/25/20 12:31





  BRIAN HARPER MD





Patient complains of: Chest pain and fluttering in her chest


History of Present Illness: 


MK SHELL is a 87 year old female


Patient is nursing home patient who is demented.  She could not provide any 

history to me.  All history was taken from the ER personnel.  Apparently she has

had fluttering in her chest and chest pain last night that currently resolved.





Past Medical History


Cardiac Medical History: Reports: Congestive Heart Failure, Hyperlipidema, 

Hypertension


Pulmonary Medical History: Reports: Chronic Obstructive Pulmonary Disease 

(COPD), Pneumonia


Neurological Medical History: Reports: Seizures


Endocrine Medical History: Reports: Diabetes Mellitus Type 2, Hypothyroidism


GI Medical History: Reports: Gastroesophageal Reflux Disease


Musculoskeltal Medical History: Reports: Arthritis, Gout


Psychiatric Medical History: Reports: Dementia





Social History


Lives with: Nursing Home


Smoking Status: Unknown if Ever Smoked





Family History


Family History: Reviewed & Not Pertinent, Other - Family history is not 

available.


Parental Family History Reviewed: Yes


Children Family History Reviewed: Yes


Sibling(s) Family History Reviewed.: Yes





Medication/Allergy


Home Medications: 








Acetaminophen [Tylenol 325 mg Tablet] 650 mg PO Q6HP PRN 03/04/20 


Albuterol Sulfate [Ventolin 0.083% Neb 2.5 mg/3 mL Ampul] 1 vial NEB RTQ6HP PRN 

03/04/20 


Albuterol Sulfate [Ventolin Hfa 8 gm Mdi] 2 puff IH Q4HP PRN 03/04/20 


Aspirin [Aspirin 81 mg Chewable Tablet] 81 mg PO DAILY 03/04/20 


Benzonatate [Tessalon Perles 100 mg Capsule] 100 mg PO TIDP PRN 03/04/20 


Budesonide/Formoterol Fumarate [Symbicort -4.5 mcg Inhaler 6 gm] 2 puff 

IH Q12 03/04/20 


Ferrous Sulfate [Feosol 325 mg Tablet] 325 mg PO DAILY 03/04/20 


Gabapentin [Neurontin 300 mg Capsule] 600 mg PO Q8 03/04/20 


Glimepiride [Amaryl 4 mg Tablet] 4 mg PO DAILY 03/04/20 


Insulin Glargine/Lixisenatide [Soliqua 100 Unit-33 Mcg/ml Pen] 28 units SQ QHS 

03/04/20 


Levothyroxine Sodium [Synthroid 0.05 mg Tablet] 0.05 mg PO Q6AM 03/04/20 


Metoprolol Tartrate [Lopressor 25 mg Tablet] 12.5 mg PO Q12 03/04/20 


Mirtazapine 30 mg PO QHS 03/04/20 


Montelukast Sodium [Singulair 10 mg Tablet] 10 mg PO DAILY 03/04/20 


Multivit,Calc,Mins/Iron/Folic [Thera M Plus Tablet] 1 tab PO QPM 03/04/20 


Omeprazole 40 mg PO BID 03/04/20 


Thiamine HCl [Thiamine 100 mg Tablet] 100 mg PO DAILY 03/04/20 


Ammonium Lactate [Lac-Hydrin 12% Lotion 225Gm/Bottle] 1 applic TOP QHS 03/25/20 


Ibuprofen [Motrin 400 mg Tablet] 400 mg PO Q3HP PRN 03/25/20 


Insulin Lispro [Humalog Insulin (Lispro) 100 unit/mL] 0 units SQ 

.PERSLIDINGSCALE 03/25/20 








Allergies/Adverse Reactions: 


                                        





barley Allergy (Verified 03/04/20 12:20)


   


ranitidine Allergy (Verified 03/04/20 12:20)


   


sulfamethoxazole [From Bactrim] Allergy (Verified 03/04/20 12:20)


   


trimethoprim [From Bactrim] Allergy (Verified 03/04/20 12:20)


   


iodine Adverse Reaction (Verified 03/04/20 12:20)


   


shellfish derived Adverse Reaction (Verified 03/04/20 12:20)


   











Review of Systems


ROS unobtainable: Due to mental status





Physical Exam


Vital Signs: 


                                        











Temp Pulse Resp BP Pulse Ox


 


 97.9 F      26 H  113/56 L  98 


 


 03/25/20 11:01     03/25/20 14:01  03/25/20 14:01  03/25/20 12:03








                                 Intake & Output











 03/24/20 03/25/20 03/26/20





 06:59 06:59 06:59


 


Intake Total   250


 


Balance   250


 


Weight   228 lb 6.382 oz











General appearance: PRESENT: no acute distress, obese


Head exam: PRESENT: atraumatic, normocephalic


Eye exam: PRESENT: PERRLA.  ABSENT: scleral icterus


Ear exam: ABSENT: bleeding, drainage


Mouth exam: PRESENT: moist, neck supple


Neck exam: ABSENT: meningismus, tenderness


Respiratory exam: PRESENT: clear to auscultation jane.  ABSENT: accessory muscle 

use, chest wall tenderness


Cardiovascular exam: PRESENT: RRR.  ABSENT: diastolic murmur, systolic murmur


Pulses: PRESENT: normal carotid pulses, normal radial pulses


GI/Abdominal exam: PRESENT: normal bowel sounds.  ABSENT: ascites, mass, 

organolmegaly


Rectal exam: PRESENT: deferred


Neurological exam: PRESENT: alert, awake, oriented to person, oriented to place,

oriented to time


Psychiatric exam: PRESENT: other - demented


Skin exam: PRESENT: other - chronic skin chnages both feet





Results


Laboratory Results: 


                                        





                                 03/25/20 10:45 





                                 03/25/20 10:45 





                                        











  03/25/20 03/25/20 03/25/20





  10:45 10:45 10:45


 


WBC  8.8  


 


RBC  3.78  


 


Hgb  9.8 L  


 


Hct  30.8 L  


 


MCV  82  


 


MCH  26.1 L  


 


MCHC  31.9 L  


 


RDW  19.1 H  


 


Plt Count  492 H  


 


Seg Neutrophils %  60.3  


 


Sodium   136.6 L 


 


Potassium   4.9 


 


Chloride   95 L 


 


Carbon Dioxide   38 H 


 


Anion Gap   4 L 


 


BUN   17 


 


Creatinine   0.84 


 


Est GFR ( Amer)   > 60 


 


Glucose   195 H 


 


Lactic Acid    1.0


 


Calcium   9.7 


 


Magnesium   2.2 


 


Total Bilirubin   0.3 


 


AST   25 


 


Alkaline Phosphatase   101 


 


Total Protein   6.5 


 


Albumin   3.1 L 


 


Lipase   165.5 








                                        











  03/25/20





  10:45


 


Troponin I  < 0.012











Impressions: 


                                        





Chest X-Ray  03/25/20 10:42


IMPRESSION:  Trace left pleural effusion.


 














Assessment and Plan





- Diagnosis


(1) Chest pain


Qualifiers: 


   Chest pain type: unspecified   Qualified Code(s): R07.9 - Chest pain, 

unspecified   


Is this a current diagnosis for this admission?: Yes   


Plan: 


unclear if she has chest pain. admit for observation. appreciate cardiology 

evaluation. check troponin. 








(2) CHF (congestive heart failure)


Qualifiers: 


   Heart failure type: unspecified 


Is this a current diagnosis for this admission?: Yes   


Plan: 


continue home meds








(3) COPD (chronic obstructive pulmonary disease)


Qualifiers: 


   COPD type: unspecified COPD   Qualified Code(s): J44.9 - Chronic obstructive 

pulmonary disease, unspecified   


Is this a current diagnosis for this admission?: Yes   


Plan: 


continue home meds








(4) Hypertension


Qualifiers: 


   Hypertension type: essential hypertension   Qualified Code(s): I10 - 

Essential (primary) hypertension   


Is this a current diagnosis for this admission?: Yes   


Plan: 


continue home meds








(5) Hypothyroidism


Qualifiers: 


   Hypothyroidism type: unspecified   Qualified Code(s): E03.9 - Hypothyroidism,

unspecified   


Is this a current diagnosis for this admission?: Yes   


Plan: 


continue home meds








(6) Severe dementia


Is this a current diagnosis for this admission?: Yes   





(7) T2DM (type 2 diabetes mellitus)


Qualifiers: 


   Diabetes mellitus long term insulin use: with long term use   Diabetes 

mellitus complication status: with hyperglycemia   Qualified Code(s): E11.65 - 

Type 2 diabetes mellitus with hyperglycemia; Z79.4 - Long term (current) use of 

insulin   


Is this a current diagnosis for this admission?: Yes   


Plan: 


continue home meds. insulin sliding scale. monitor glucose levels

## 2020-03-26 VITALS — SYSTOLIC BLOOD PRESSURE: 117 MMHG | DIASTOLIC BLOOD PRESSURE: 60 MMHG

## 2020-03-26 RX ADMIN — Medication SCH ML: at 05:05

## 2020-03-26 RX ADMIN — METOPROLOL TARTRATE SCH MG: 25 TABLET, FILM COATED ORAL at 10:20

## 2020-03-26 RX ADMIN — GABAPENTIN SCH MG: 300 CAPSULE ORAL at 05:05

## 2020-03-26 NOTE — PDOC DISCHARGE SUMMARY
Impression





- Admit/DC Date/PCP


Admission Date/Primary Care Provider: 


  03/25/20 12:31





  BRIAN HARPER MD





Discharge Date: 03/26/20





- Discharge Diagnosis


(1) Chest pain


Is this a current diagnosis for this admission?: Yes   





(2) CHF (congestive heart failure)


Is this a current diagnosis for this admission?: Yes   





(3) COPD (chronic obstructive pulmonary disease)


Is this a current diagnosis for this admission?: Yes   





(4) Hypertension


Is this a current diagnosis for this admission?: Yes   





(5) Hypothyroidism


Is this a current diagnosis for this admission?: Yes   





(6) Severe dementia


Is this a current diagnosis for this admission?: Yes   





(7) T2DM (type 2 diabetes mellitus)


Is this a current diagnosis for this admission?: Yes   





- Assessment


Summary: 


Patient complains of: Chest pain and fluttering in her chest


History of Present Illness: 


MK SHELL is a 87 year old female


Patient is nursing home patient who is demented.  She could not provide any 

history to me.  All history was taken from the ER personnel.  Apparently she has

had fluttering in her chest and chest pain last night that currently resolved.





Physical Exam


General appearance:  no acute distress, obese


Head exam:  atraumatic, normocephalic


Eye exam:  PERRLA.  no scleral icterus


Ear exam: no bleeding, drainage


Mouth exam:  moist, neck supple


Neck exam: no meningismus, tenderness


Respiratory exam:  clear to auscultation jane.  no accessory muscle use, chest 

wall tenderness


Cardiovascular exam:  RRR.  no diastolic murmur, systolic murmur


Pulses:  normal carotid pulses, normal radial pulses


GI/Abdominal exam:  normal bowel sounds.  no ascites, mass, organolmegaly


Rectal exam:  deferred


Neurological exam:  alert, awake, oriented to person, oriented to place, 

oriented to time


Psychiatric exam:  other - demented


Skin exam:  other - chronic skin chnages both feet





Hospital course:


(1) Chest pain


unclear if she has chest pain. admitted for observation. appreciate cardiology 

evaluation. negative serial troponin. 





(2) CHF (congestive heart failure)


continue home meds





(3) COPD (chronic obstructive pulmonary disease)


continue home meds





(4) Hypertension


continue home meds





(5) Hypothyroidism


continue home meds





(6) Severe dementia





(7) T2DM (type 2 diabetes mellitus)


continue home meds. insulin sliding scale. monitor glucose levels 





- Additional Information


Discharge Diet: As Tolerated


Discharge Activity: Activity As Tolerated


Referrals: 


BRIAN HARPER MD [Primary Care Provider] - Follow up as needed


Home Medications: 








Acetaminophen [Tylenol 325 mg Tablet] 650 mg PO Q6HP PRN 03/04/20 


Albuterol Sulfate [Ventolin 0.083% Neb 2.5 mg/3 mL Ampul] 1 vial NEB RTQ6HP PRN 

03/04/20 


Albuterol Sulfate [Ventolin Hfa 8 gm Mdi] 2 puff IH Q4HP PRN 03/04/20 


Aspirin [Aspirin 81 mg Chewable Tablet] 81 mg PO DAILY 03/04/20 


Benzonatate [Tessalon Perles 100 mg Capsule] 100 mg PO TIDP PRN 03/04/20 


Budesonide/Formoterol Fumarate [Symbicort -4.5 mcg Inhaler 6 gm] 2 puff 

IH Q12 03/04/20 


Ferrous Sulfate [Feosol 325 mg Tablet] 325 mg PO DAILY 03/04/20 


Gabapentin [Neurontin 300 mg Capsule] 600 mg PO Q8 03/04/20 


Glimepiride [Amaryl 4 mg Tablet] 4 mg PO DAILY 03/04/20 


Insulin Glargine/Lixisenatide [Soliqua 100 Unit-33 Mcg/ml Pen] 28 units SQ QHS 

03/04/20 


Levothyroxine Sodium [Synthroid 0.05 mg Tablet] 0.05 mg PO Q6AM 03/04/20 


Metoprolol Tartrate [Lopressor 25 mg Tablet] 12.5 mg PO Q12 03/04/20 


Mirtazapine 30 mg PO QHS 03/04/20 


Montelukast Sodium [Singulair 10 mg Tablet] 10 mg PO DAILY 03/04/20 


Multivit,Calc,Mins/Iron/Folic [Thera M Plus Tablet] 1 tab PO QPM 03/04/20 


Omeprazole 40 mg PO BID 03/04/20 


Thiamine HCl [Thiamine 100 mg Tablet] 100 mg PO DAILY 03/04/20 


Ammonium Lactate [Lac-Hydrin 12% Lotion 225Gm/Bottle] 1 applic TOP QHS 03/25/20 


Ibuprofen [Motrin 400 mg Tablet] 400 mg PO Q3HP PRN 03/25/20 


Insulin Lispro [Humalog Insulin (Lispro) 100 unit/mL] 0 units SQ 

.PERSLIDINGSCALE 03/25/20 











History of Present Illiness


History of Present Illness: 


MK SHELL is a 87 year old female


Patient is nursing home patient who is demented.  She could not provide any 

history to me.  All history was taken from the ER personnel.  Apparently she has

had fluttering in her chest and chest pain last night that currently resolved.





Physical Exam


Vital Signs: 


                                        











Temp Pulse Resp BP Pulse Ox


 


 98.2 F   81   17   120/50 L  100 


 


 03/26/20 03:07  03/26/20 07:00  03/26/20 03:07  03/26/20 03:07  03/26/20 03:07








                                 Intake & Output











 03/25/20 03/26/20 03/27/20





 06:59 06:59 06:59


 


Intake Total  250 


 


Balance  250 


 


Weight  225 lb 1.471 oz 














Results


Laboratory Results: 


                                        











WBC  8.8 10^3/uL (4.0-10.5)   03/25/20  10:45    


 


RBC  3.78 10^6/uL (3.72-5.28)   03/25/20  10:45    


 


Hgb  9.8 g/dL (12.0-15.5)  L  03/25/20  10:45    


 


Hct  30.8 % (36.0-47.0)  L  03/25/20  10:45    


 


MCV  82 fl (80-97)   03/25/20  10:45    


 


MCH  26.1 pg (27.0-33.4)  L  03/25/20  10:45    


 


MCHC  31.9 g/dL (32.0-36.0)  L  03/25/20  10:45    


 


RDW  19.1 % (11.5-14.0)  H  03/25/20  10:45    


 


Plt Count  492 10^3/uL (150-450)  H  03/25/20  10:45    


 


Lymph % (Auto)  28.5 % (13-45)   03/25/20  10:45    


 


Mono % (Auto)  5.9 % (3-13)   03/25/20  10:45    


 


Eos % (Auto)  4.5 % (0-6)   03/25/20  10:45    


 


Baso % (Auto)  0.8 % (0-2)   03/25/20  10:45    


 


Absolute Neuts (auto)  5.3 10^3/uL (1.7-8.2)   03/25/20  10:45    


 


Absolute Lymphs (auto)  2.5 10^3/uL (0.5-4.7)   03/25/20  10:45    


 


Absolute Monos (auto)  0.5 10^3/uL (0.1-1.4)   03/25/20  10:45    


 


Absolute Eos (auto)  0.4 10^3/uL (0.0-0.6)   03/25/20  10:45    


 


Absolute Basos (auto)  0.1 10^3/uL (0.0-0.2)   03/25/20  10:45    


 


Seg Neutrophils %  60.3 % (42-78)   03/25/20  10:45    


 


Sodium  136.6 mmol/L (137-145)  L  03/25/20  10:45    


 


Potassium  4.9 mmol/L (3.6-5.0)   03/25/20  10:45    


 


Chloride  95 mmol/L ()  L  03/25/20  10:45    


 


Carbon Dioxide  38 mmol/L (22-30)  H  03/25/20  10:45    


 


Anion Gap  4  (5-19)  L  03/25/20  10:45    


 


BUN  17 mg/dL (7-20)   03/25/20  10:45    


 


Creatinine  0.84 mg/dL (0.52-1.25)   03/25/20  10:45    


 


Est GFR ( Amer)  > 60  (>60)   03/25/20  10:45    


 


Est GFR (MDRD) Non-Af  > 60  (>60)   03/25/20  10:45    


 


Glucose  195 mg/dL ()  H  03/25/20  10:45    


 


POC Glucose  97 mg/dL ()   03/26/20  08:08    


 


Lactic Acid  1.0 mmol/L (0.7-2.1)   03/25/20  10:45    


 


Calcium  9.7 mg/dL (8.4-10.2)   03/25/20  10:45    


 


Magnesium  2.2 mg/dL (1.6-2.3)   03/25/20  10:45    


 


Total Bilirubin  0.3 mg/dL (0.2-1.3)   03/25/20  10:45    


 


Direct Bilirubin  0.3 mg/dL (0.0-0.4)   03/25/20  10:45    


 


Neonat Total Bilirubin  Not Reportable   03/25/20  10:45    


 


Neonat Direct Bilirubin  Not Reportable   03/25/20  10:45    


 


Neonat Indirect Bili  Not Reportable   03/25/20  10:45    


 


AST  25 U/L (14-36)   03/25/20  10:45    


 


ALT  13 U/L (<35)   03/25/20  10:45    


 


Alkaline Phosphatase  101 U/L ()   03/25/20  10:45    


 


Troponin I  0.013 ng/mL  03/26/20  05:08    


 


Total Protein  6.5 g/dL (6.3-8.2)   03/25/20  10:45    


 


Albumin  3.1 g/dL (3.5-5.0)  L  03/25/20  10:45    


 


Lipase  165.5 U/L ()   03/25/20  10:45    








                                        











  03/25/20 03/25/20 03/26/20





  10:45 18:04 00:16


 


Troponin I  < 0.012  < 0.012  < 0.012














  03/26/20





  05:08


 


Troponin I  0.013











Impressions: 


                                        





Chest X-Ray  03/25/20 10:42


IMPRESSION:  Trace left pleural effusion.


 














Stroke


Is this a Stroke Patient?: No





Acute Heart Failure





- **


Is this a Heart Failure Patient?: No